# Patient Record
Sex: MALE | Race: WHITE | Employment: FULL TIME | URBAN - METROPOLITAN AREA
[De-identification: names, ages, dates, MRNs, and addresses within clinical notes are randomized per-mention and may not be internally consistent; named-entity substitution may affect disease eponyms.]

---

## 2018-07-01 ENCOUNTER — HOSPITAL ENCOUNTER (OUTPATIENT)
Dept: OTHER | Age: 23
Discharge: HOME | End: 2018-07-01
Attending: PSYCHIATRY & NEUROLOGY | Admitting: PSYCHIATRY & NEUROLOGY

## 2018-07-12 LAB
CHOLESTEROL: 118 MG/DL
GLUCOSE FASTING: 76 MG/DL (ref 70–99)
HDLC SERPL-MCNC: 31 MG/DL
LDL CHOLESTEROL DIRECT: 91 MG/DL
TRIGL SERPL-MCNC: 68 MG/DL

## 2018-09-25 ENCOUNTER — HOSPITAL ENCOUNTER (EMERGENCY)
Age: 23
Discharge: HOME | End: 2018-09-25
Payer: COMMERCIAL

## 2018-09-25 ENCOUNTER — APPOINTMENT (OUTPATIENT)
Dept: CT IMAGING | Age: 23
End: 2018-09-25
Payer: COMMERCIAL

## 2018-09-25 VITALS
RESPIRATION RATE: 17 BRPM | HEIGHT: 71 IN | BODY MASS INDEX: 21 KG/M2 | SYSTOLIC BLOOD PRESSURE: 131 MMHG | TEMPERATURE: 97.9 F | HEART RATE: 81 BPM | WEIGHT: 150 LBS | OXYGEN SATURATION: 100 % | DIASTOLIC BLOOD PRESSURE: 88 MMHG
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
## 2018-09-25 DIAGNOSIS — S00.83XA FACIAL CONTUSION, INITIAL ENCOUNTER: ICD-10-CM

## 2018-09-25 DIAGNOSIS — S09.90XA CLOSED HEAD INJURY, INITIAL ENCOUNTER: ICD-10-CM

## 2018-09-25 PROCEDURE — 70450 CT HEAD/BRAIN W/O DYE: CPT

## 2018-09-25 PROCEDURE — 6370000000 HC RX 637 (ALT 250 FOR IP): Performed by: PHYSICIAN ASSISTANT

## 2018-09-25 PROCEDURE — 6360000002 HC RX W HCPCS: Performed by: PHYSICIAN ASSISTANT

## 2018-09-25 PROCEDURE — 70486 CT MAXILLOFACIAL W/O DYE: CPT

## 2018-09-25 PROCEDURE — 99284 EMERGENCY DEPT VISIT MOD MDM: CPT

## 2018-09-25 RX ORDER — ONDANSETRON 4 MG/1
4 TABLET, ORALLY DISINTEGRATING ORAL ONCE
Status: COMPLETED | OUTPATIENT
Start: 2018-09-25 | End: 2018-09-25

## 2018-09-25 RX ORDER — IBUPROFEN 400 MG/1
400 TABLET ORAL ONCE
Status: COMPLETED | OUTPATIENT
Start: 2018-09-25 | End: 2018-09-25

## 2018-09-25 RX ADMIN — ONDANSETRON 4 MG: 4 TABLET, ORALLY DISINTEGRATING ORAL at 15:28

## 2018-09-25 RX ADMIN — IBUPROFEN 400 MG: 400 TABLET ORAL at 15:28

## 2018-09-25 ASSESSMENT — PAIN DESCRIPTION - PAIN TYPE: TYPE: ACUTE PAIN

## 2018-09-25 ASSESSMENT — PAIN DESCRIPTION - LOCATION: LOCATION: FACE;HEAD

## 2018-09-25 ASSESSMENT — PAIN DESCRIPTION - FREQUENCY: FREQUENCY: CONTINUOUS

## 2018-09-25 ASSESSMENT — PAIN SCALES - GENERAL: PAINLEVEL_OUTOF10: 7

## 2018-09-25 ASSESSMENT — PAIN DESCRIPTION - DESCRIPTORS: DESCRIPTORS: THROBBING
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
## 2019-03-05 ENCOUNTER — HOSPITAL ENCOUNTER (EMERGENCY)
Age: 24
Discharge: HOME | End: 2019-03-05
Payer: COMMERCIAL

## 2019-03-05 ENCOUNTER — APPOINTMENT (OUTPATIENT)
Dept: CT IMAGING | Age: 24
End: 2019-03-05
Payer: COMMERCIAL

## 2019-03-05 VITALS
RESPIRATION RATE: 22 BRPM | DIASTOLIC BLOOD PRESSURE: 86 MMHG | OXYGEN SATURATION: 98 % | SYSTOLIC BLOOD PRESSURE: 125 MMHG | TEMPERATURE: 97.9 F | HEART RATE: 88 BPM
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
## 2019-03-05 DIAGNOSIS — M54.2 NECK PAIN: ICD-10-CM

## 2019-03-05 DIAGNOSIS — M54.50 ACUTE MIDLINE LOW BACK PAIN WITHOUT SCIATICA: ICD-10-CM

## 2019-03-05 DIAGNOSIS — S09.90XA INJURY OF HEAD, INITIAL ENCOUNTER: ICD-10-CM

## 2019-03-05 PROCEDURE — 70450 CT HEAD/BRAIN W/O DYE: CPT

## 2019-03-05 PROCEDURE — 72128 CT CHEST SPINE W/O DYE: CPT

## 2019-03-05 PROCEDURE — 72192 CT PELVIS W/O DYE: CPT

## 2019-03-05 PROCEDURE — 99284 EMERGENCY DEPT VISIT MOD MDM: CPT

## 2019-03-05 PROCEDURE — 72131 CT LUMBAR SPINE W/O DYE: CPT

## 2019-03-05 PROCEDURE — 72125 CT NECK SPINE W/O DYE: CPT

## 2019-03-05 RX ORDER — CYCLOBENZAPRINE HCL 10 MG
10 TABLET ORAL 3 TIMES DAILY PRN
Qty: 10 TABLET | Refills: 0 | Status: SHIPPED | OUTPATIENT
Start: 2019-03-05 | End: 2019-03-12

## 2019-03-05 RX ORDER — NAPROXEN 500 MG/1
500 TABLET ORAL 2 TIMES DAILY
Qty: 15 TABLET | Refills: 0 | Status: SHIPPED | OUTPATIENT
Start: 2019-03-05 | End: 2020-08-02

## 2019-03-05 ASSESSMENT — PAIN SCALES - GENERAL: PAINLEVEL_OUTOF10: 5

## 2019-05-03 ENCOUNTER — HOSPITAL ENCOUNTER (EMERGENCY)
Age: 24
Discharge: HOME | End: 2019-05-03
Payer: COMMERCIAL

## 2019-05-03 VITALS
DIASTOLIC BLOOD PRESSURE: 68 MMHG | RESPIRATION RATE: 18 BRPM | OXYGEN SATURATION: 98 % | TEMPERATURE: 97.9 F | HEART RATE: 79 BPM | WEIGHT: 150 LBS | SYSTOLIC BLOOD PRESSURE: 129 MMHG | HEIGHT: 71 IN | BODY MASS INDEX: 21 KG/M2

## 2019-05-03 DIAGNOSIS — L03.116 CELLULITIS OF LEFT LOWER EXTREMITY: Primary | ICD-10-CM

## 2019-05-03 PROCEDURE — 6370000000 HC RX 637 (ALT 250 FOR IP): Performed by: PHYSICIAN ASSISTANT

## 2019-05-03 PROCEDURE — 99282 EMERGENCY DEPT VISIT SF MDM: CPT

## 2019-05-03 RX ORDER — SULFAMETHOXAZOLE AND TRIMETHOPRIM 800; 160 MG/1; MG/1
1 TABLET ORAL ONCE
Status: COMPLETED | OUTPATIENT
Start: 2019-05-03 | End: 2019-05-03

## 2019-05-03 RX ORDER — IBUPROFEN 600 MG/1
600 TABLET ORAL EVERY 6 HOURS PRN
Qty: 20 TABLET | Refills: 0 | Status: SHIPPED | OUTPATIENT
Start: 2019-05-03 | End: 2020-08-02

## 2019-05-03 RX ORDER — CEPHALEXIN 500 MG/1
500 CAPSULE ORAL 4 TIMES DAILY
Qty: 28 CAPSULE | Refills: 0 | Status: SHIPPED | OUTPATIENT
Start: 2019-05-03 | End: 2019-05-10

## 2019-05-03 RX ORDER — SULFAMETHOXAZOLE AND TRIMETHOPRIM 800; 160 MG/1; MG/1
1 TABLET ORAL 2 TIMES DAILY
Qty: 14 TABLET | Refills: 0 | Status: SHIPPED | OUTPATIENT
Start: 2019-05-03 | End: 2019-05-10

## 2019-05-03 RX ORDER — CEPHALEXIN 250 MG/1
500 CAPSULE ORAL ONCE
Status: COMPLETED | OUTPATIENT
Start: 2019-05-03 | End: 2019-05-03

## 2019-05-03 RX ORDER — IBUPROFEN 600 MG/1
600 TABLET ORAL ONCE
Status: COMPLETED | OUTPATIENT
Start: 2019-05-03 | End: 2019-05-03

## 2019-05-03 RX ADMIN — CEPHALEXIN 500 MG: 250 CAPSULE ORAL at 03:49

## 2019-05-03 RX ADMIN — SULFAMETHOXAZOLE AND TRIMETHOPRIM 1 TABLET: 800; 160 TABLET ORAL at 03:49

## 2019-05-03 RX ADMIN — IBUPROFEN 600 MG: 600 TABLET ORAL at 03:49

## 2019-05-03 ASSESSMENT — PAIN SCALES - GENERAL
PAINLEVEL_OUTOF10: 10
PAINLEVEL_OUTOF10: 10

## 2019-05-03 ASSESSMENT — PAIN DESCRIPTION - ORIENTATION: ORIENTATION: LEFT;UPPER

## 2019-05-03 ASSESSMENT — PAIN DESCRIPTION - LOCATION: LOCATION: LEG

## 2019-05-03 NOTE — ED PROVIDER NOTES
Triage Chief Complaint:   Wound Check    [place]:  Francois Augustin is a 26 y.o. male that presents a complaining of a red area, warmth, pain. Rash on the left inner thigh. Been present ×3 days \"a scab then redness ensued. No fever chills nausea vomiting diarrhea eating and drinking baseline eliminating baseline. He has has staph infection skin of in the past.    ROS:  REVIEW OF SYSTEMS    At least 10 systems reviewed      All other review of systems are negative  See HPI and nursing notes for additional information       Past Medical History:   Diagnosis Date    ADD (attention deficit disorder)     ADHD (attention deficit hyperactivity disorder)     Asthma     Bipolar 1 disorder ([facility] [address]     Murmur     Staph aureus infection      Past Surgical History:   Procedure Laterality Date    HAND SURGERY Right      History reviewed. No pertinent family history.   Social History     Socioeconomic History    Marital status: Single     Spouse name: Not on file    Number of children: Not on file    Years of education: Not on file    Highest education level: Not on file   Occupational History    Not on file   Social Needs    Financial resource strain: Not on file    Food insecurity:     Worry: Not on file     Inability: Not on file    Transportation needs:     Medical: Not on file     Non-medical: Not on file   Tobacco Use    Smoking status: Never Smoker    Smokeless tobacco: Never Used   Substance and Sexual Activity    Alcohol use: No    Drug use: Yes     Types: Marijuana    Sexual activity: Not on file   Lifestyle    Physical activity:     Days per week: Not on file     Minutes per session: Not on file    Stress: Not on file   Relationships    Social connections:     Talks on phone: Not on file     Gets together: Not on file     Attends Orthodox service: Not on file     Active member of club or organization: Not on file     Attends meetings of clubs or organizations: Not on file     Relationship status: Not on file    Intimate partner violence:     Fear of current or ex partner: Not on file     Emotionally abused: Not on file     Physically abused: Not on file     Forced sexual activity: Not on file   Other Topics Concern    Not on file   Social History Narrative    Not on file     Current Facility-Administered Medications   Medication Dose Route Frequency Provider Last Rate Last Dose    ibuprofen (ADVIL;MOTRIN) tablet 600 mg  600 mg Oral Once Neha Mayur, PA-C        sulfamethoxazole-trimethoprim (BACTRIM DS;SEPTRA DS) 800-160 MG per tablet 1 tablet  1 tablet Oral Once Neha Mayur, PA-C        cephALEXin (KEFLEX) capsule 500 mg  500 mg Oral Once Neha Pock, PA-C         Current Outpatient Medications   Medication Sig Dispense Refill    sulfamethoxazole-trimethoprim (BACTRIM DS) 800-160 MG per tablet Take 1 tablet by mouth 2 times daily for 7 days 14 tablet 0    cephALEXin (KEFLEX) 500 MG capsule Take 1 capsule by mouth 4 times daily for 7 days 28 capsule 0    ibuprofen (IBU) 600 MG tablet Take 1 tablet by mouth every 6 hours as needed for Pain 20 tablet 0    naproxen (NAPROSYN) 500 MG tablet Take 1 tablet by mouth 2 times daily 15 tablet 0    ondansetron (ZOFRAN ODT) 4 MG disintegrating tablet Take 1 tablet by mouth every 8 hours as needed for Nausea 15 tablet 0    acetaminophen (AMINOFEN) 325 MG tablet Take 2 tablets by mouth every 6 hours as needed for Pain 30 tablet 0    methylPREDNISolone (MEDROL, DIONNA,) 4 MG tablet Medrol Dose dionna - Use as directed. #1 pack. (No refills) 1 kit 0     No Known Allergies    Nursing Notes Reviewed    Physical Exam:  ED Triage Vitals [05/03/19 0308]   Enc Vitals Group      /68      Pulse 79      Resp 18      Temp 97.9 °F (36.6 °C)      Temp Source Oral      SpO2 100 %      Weight 150 lb (68 kg)      Height 5' 11\" (1.803 m)      Head Circumference       Peak Flow       Pain Score       Pain Loc       Pain Edu? Excl.  in [address]? General :Patient is awake alert oriented person place and time no acute distress nontoxic appearing  HEENT: Pupils are equally round and reactive to light extraocular motors are intact conjunctivae clear sclerae white there is no injection no icterus. Nose without any rhinorrhea or epistaxis. Chest wall: There is no tenderness to palpation over the chest wall or over ribs  Abdomen: Abdomen is soft nontender nondistended. There is no firm or pulsatile masses no rebound rigidity or guarding negative Mckeon's negative McBurney, no peritoneal signs  Suprapubic:  there is no tenderness to palpation over the external bladder   Musculoskeletal: 5 out of 5 strength in all 4 extremities full flexion extension abduction and adduction supination pronation of all extremities and all digits. No obvious muscle atrophy is noted. No focal muscle deficits are appreciated  Dermatology: Skin is warm and dry there is no obvious abscesses lacerations or lesions noted. Left inner thigh has a small scab with surrounding erythema, warmth. Erythema is branching approximately 6 x 10 cm. With no streaking. Psych: Mentation is grossly normal cognition is grossly normal. Affect is appropriate  Neuro: Motor intact sensory intact cranial nerves II through XII are intact level of consciousness is normal cerebellar function is normal reflexes are grossly normal. No evidence of incontinence or loss of bowel or bladder no saddle anesthesia noted       I have reviewed and interpreted all of the currently available lab results from this visit (if applicable):  No results found for this visit on 05/03/19. Radiographs (if obtained):  [] The following radiograph was interpreted by myself in the absence of a radiologist:   [] Radiologist's Report Reviewed:  No orders to display       EKG (if obtained):   Please See Note of attending physician for EKG interpretation. Chart review shows recent radiograph(s):  No results found.     MDM:   Systemic symptoms patient presents today with cellulitis to the left inner thigh. No involvement of the testicles. There is not circumferential. Vital signs are stable. Patient's afebrile. Versus her antibodies and she is provided as well as prescription for the same. Return precautions are provided for sooner return. Patient is to come here in 48 hours for wound check. My typical dicussion, presentation, and considerations for this patients' chief complaint, diagnosis, differential diagnosis, medications, medication use, medication safety and medication interactions have been explained and outlined to this patient for this patient encounter. I have stressed need for follow up and reexamination for this encounter and or return to the emergency department if any changes or any concern  I have discussed the findings of today's workup with the patient and present family members and have addressed their questions and concerns. Important warning signs as well as new or worsening symptoms which would necessitate immediate return to the ED were discussed. The plan is to discharge from the ED at this time, and the patient is in stable condition. The patient acknowledged understanding is agreeable with this plan. The patient and/or family and I have discussed the diagnosis and risks, and we agree with discharging home to follow-up with their primary care, specialist or referral doctor. Questions addressed. Disposition and follow-up agreed upon. Specific discharge instructions explained. We have discussed the symptoms which are most concerning that necessitate immediate return. We also discussed returning to the Emergency Department immediately if new or worsening symptoms occur.        I independently managed patient today in the ED        /68   Pulse 79   Temp 97.9 °F (36.6 °C) (Oral)   Resp 18   Ht 5' 11\" (1.803 m)   Wt 150 lb (68 kg)   SpO2 100%   BMI 20.92 kg/m²       Clinical Impression:  1.  Cellulitis of left lower extremity        Disposition referral (if applicable):  [facility] Emergency Department  [address]  [phone]  In 2 days  For wound re-check    Disposition medications (if applicable):  New Prescriptions    CEPHALEXIN (KEFLEX) 500 MG CAPSULE    Take 1 capsule by mouth 4 times daily for 7 days    IBUPROFEN (IBU) 600 MG TABLET    Take 1 tablet by mouth every 6 hours as needed for Pain    SULFAMETHOXAZOLE-TRIMETHOPRIM (BACTRIM DS) 800-160 MG PER TABLET    Take 1 tablet by mouth 2 times daily for 7 days         Comment: Please note this report has been produced using speech recognition software and may contain errors related to that system including errors in grammar, punctuation, and spelling, as well as words and phrases that may be inappropriate. If there are any questions or concerns please feel free to contact the dictating provider for clarification.       Tanesha London, [address] [place] [address]  05/03/19 9728

## 2019-06-01 ENCOUNTER — HOSPITAL ENCOUNTER (EMERGENCY)
Age: 24
Discharge: HOME | End: 2019-06-02
Payer: COMMERCIAL

## 2019-06-01 DIAGNOSIS — L02.91 ABSCESS: Primary | ICD-10-CM

## 2019-06-01 PROCEDURE — 99283 EMERGENCY DEPT VISIT LOW MDM: CPT

## 2019-06-01 PROCEDURE — 4500000028 HC INTERMEDIATE PROCEDURE

## 2019-06-01 ASSESSMENT — PAIN DESCRIPTION - LOCATION: LOCATION: BUTTOCKS

## 2019-06-01 ASSESSMENT — PAIN DESCRIPTION - PAIN TYPE: TYPE: ACUTE PAIN

## 2019-06-01 ASSESSMENT — PAIN SCALES - GENERAL: PAINLEVEL_OUTOF10: 10

## 2019-06-02 VITALS
OXYGEN SATURATION: 99 % | BODY MASS INDEX: 21 KG/M2 | HEIGHT: 71 IN | DIASTOLIC BLOOD PRESSURE: 91 MMHG | RESPIRATION RATE: 15 BRPM | HEART RATE: 91 BPM | SYSTOLIC BLOOD PRESSURE: 139 MMHG | WEIGHT: 150 LBS | TEMPERATURE: 98.3 F

## 2019-06-02 PROCEDURE — 2500000003 HC RX 250 WO HCPCS: Performed by: PHYSICIAN ASSISTANT

## 2019-06-02 RX ORDER — LIDOCAINE HYDROCHLORIDE AND EPINEPHRINE BITARTRATE 20; .01 MG/ML; MG/ML
20 INJECTION, SOLUTION SUBCUTANEOUS ONCE
Status: COMPLETED | OUTPATIENT
Start: 2019-06-02 | End: 2019-06-02

## 2019-06-02 RX ORDER — CEPHALEXIN 500 MG/1
500 CAPSULE ORAL 4 TIMES DAILY
Qty: 40 CAPSULE | Refills: 0 | Status: SHIPPED | OUTPATIENT
Start: 2019-06-02 | End: 2019-06-12

## 2019-06-02 RX ORDER — SULFAMETHOXAZOLE AND TRIMETHOPRIM 800; 160 MG/1; MG/1
1 TABLET ORAL 2 TIMES DAILY
Qty: 20 TABLET | Refills: 0 | Status: SHIPPED | OUTPATIENT
Start: 2019-06-02 | End: 2019-06-12

## 2019-06-02 RX ADMIN — LIDOCAINE HYDROCHLORIDE,EPINEPHRINE BITARTRATE 20 ML: 20; .01 INJECTION, SOLUTION INFILTRATION; PERINEURAL at 00:20

## 2019-06-02 ASSESSMENT — PAIN SCALES - GENERAL: PAINLEVEL_OUTOF10: 10

## 2019-06-02 NOTE — ED TRIAGE NOTES
Pt presents to ED for c/o abscess to left buttocks. Pt states was seen at hospital in [facility] a week ago. Pt states he is currently taking two ATB and has hx of staph infections. States being treated for most recent staph infection to left inner thigh and now appears to have infection left buttocks. States abscess is worsening and not improving with ATB use. Pt reports fevers and chills at home. Pt took ibuprofen at 10 AM today but not has had any since. Pt mason further complaints. A&ox4 speaking clear complete sentences. Call light in reach. Will cont. To monitor.

## 2019-06-02 NOTE — ED PROVIDER NOTES
eMERGENCY dEPARTMENT eNCOUnter        [facility]    Chief Complaint   Patient presents with    Abscess     buttocks       [facility] [place]    Belem Fajardo is a 26 y.o. male who presents with an abscess. Onset:  4 days ago. Location:  Left inner buttocks. Character:  Swollen, indurated, red, tender. Severity of pain is 10/10. Drainage:  Some bloody drainage. Fever:  Denies. Patient reports recurrent staph infections over the last month. He states he has been taking ABX intermittently, reports taking a dose of 2 different ABX this morning. REVIEW OF SYSTEMS    See HPI for further details. Review of systems otherwise negative. Constitutional:  Denies fever. Respiratory:  Denies any shortness of breath. Cardiovascular:  Denies chest pain. GI:  Denies nausea or vomiting. Musculoskeletal:  Denies extremity pain.   Integument:  + abscess    PAST MEDICAL HISTORY    Past Medical History:   Diagnosis Date    ADD (attention deficit disorder)     ADHD (attention deficit hyperactivity disorder)     Asthma     Bipolar 1 disorder (H[facility])     Murmur     Staph aureus infection        SURGICAL HISTORY    Past Surgical History:   Procedure Laterality Date    HAND SURGERY Right        CURRENT MEDICATIONS    Current Outpatient Rx   Medication Sig Dispense Refill    sulfamethoxazole-trimethoprim (BACTRIM DS) 800-160 MG per tablet Take 1 tablet by mouth 2 times daily for 10 days 20 tablet 0    cephALEXin (KEFLEX) 500 MG capsule Take 1 capsule by mouth 4 times daily for 10 days 40 capsule 0    ibuprofen (IBU) 600 MG tablet Take 1 tablet by mouth every 6 hours as needed for Pain 20 tablet 0    naproxen (NAPROSYN) 500 MG tablet Take 1 tablet by mouth 2 times daily 15 tablet 0    ondansetron (ZOFRAN ODT) 4 MG disintegrating tablet Take 1 tablet by mouth every 8 hours as needed for Nausea 15 tablet 0    acetaminophen (AMINOFEN) 325 MG tablet Take 2 tablets by mouth every 6 hours as needed for Pain 30 tablet 0    methylPREDNISolone (MEDROL, DIONNA,) 4 MG tablet Medrol Dose dionna - Use as directed. #1 pack. (No refills) 1 kit 0       ALLERGIES    No Known Allergies    FAMILY HISTORY    History reviewed. No pertinent family history. SOCIAL HISTORY    Social History     Socioeconomic History    Marital status: Single     Spouse name: None    Number of children: None    Years of education: None    Highest education level: None   Occupational History    None   Social Needs    Financial resource strain: None    Food insecurity:     Worry: None     Inability: None    Transportation needs:     Medical: None     Non-medical: None   Tobacco Use    Smoking status: Never Smoker    Smokeless tobacco: Never Used   Substance and Sexual Activity    Alcohol use: No    Drug use: Yes     Types: Marijuana    Sexual activity: None   Lifestyle    Physical activity:     Days per week: None     Minutes per session: None    Stress: None   Relationships    Social connections:     Talks on phone: None     Gets together: None     Attends Church service: None     Active member of club or organization: None     Attends meetings of clubs or organizations: None     Relationship status: None    Intimate partner violence:     Fear of current or ex partner: None     Emotionally abused: None     Physically abused: None     Forced sexual activity: None   Other Topics Concern    None   Social History Narrative    None       PHYSICAL EXAM    VITAL SIGNS: BP (!) 139/91   Pulse 91   Temp 98.3 °F (36.8 °C) (Oral)   Resp 15   Ht 5' 11\" (1.803 m)   Wt 150 lb (68 kg)   SpO2 99%   BMI 20.92 kg/m²   Constitutional:  Well developed, well nourished, no acute distress, non-toxic appearance   HENT:  NC/AT. Ears, nose, mouth normal.  Neck:  Supple. Respiratory:  Respirations unlabored. Musculoskeletal:  No deformities. Integument:  3 cm diameter area of induration/erythema to the left inner buttocks area, does not extend to the rectum. PROCEDURES    Incision and Drainage Procedure Note    Indication: Abscess to left buttocks    Procedure: The patient was positioned appropriately and the skin was prepped with betadine. Local anesthesia was obtained using approximately 4 cc of 2% lidocaine with epi. A 1 cm incision was made and copious purulent material was expressed. Loculations were broken up with a curved hemostat. The drainage cavity was then irrigated with high pressure NS and a dressing was applied to the site. The patient tolerated the procedure well. No immediate complications. ED COURSE & MEDICAL DECISION MAKING    Pertinent Labs & Imaging studies reviewed. (See chart for details)  -  Patient seen and evaluated in the emergency department. -  Triage and nursing notes reviewed and incorporated. -  Old chart records reviewed and incorporated. -  Supervising physician was Dr. Marion Ford. Patient was seen independently. -  Differential diagnosis includes: abscess, cellulitis, lipoma, folliculitis, and others  -  Procedures:  I&D as noted above  -  Disposition:  Home   Prescriptions: Bactrim & Keflex--discussed I suspect these are the ABX he was previously prescribed. He states he only has about 3 days worth left, so will give another round and encouraged him to take them as prescribed to completion. Instructed on care including warm compresses and dressing changes. Re-check with PCP in 2-3 days or return to the ED for worsening symptoms, including increased erythema/induration/fluctuance, fever, or worse. Patient agreeable with plan of care and disposition. FINAL IMPRESSION    1.  Abscess                Bg Oliva PA-C  06/02/19 9399

## 2019-09-27 ENCOUNTER — HOSPITAL ENCOUNTER (EMERGENCY)
Age: 24
Discharge: HOME | End: 2019-09-27
Payer: COMMERCIAL

## 2019-09-27 VITALS
SYSTOLIC BLOOD PRESSURE: 127 MMHG | OXYGEN SATURATION: 99 % | BODY MASS INDEX: 21.7 KG/M2 | WEIGHT: 155 LBS | HEART RATE: 72 BPM | TEMPERATURE: 97.6 F | RESPIRATION RATE: 15 BRPM | HEIGHT: 71 IN | DIASTOLIC BLOOD PRESSURE: 76 MMHG

## 2019-09-27 DIAGNOSIS — L02.419 CELLULITIS AND ABSCESS OF LEG: Primary | ICD-10-CM

## 2019-09-27 DIAGNOSIS — L03.119 CELLULITIS AND ABSCESS OF LEG: Primary | ICD-10-CM

## 2019-09-27 PROCEDURE — 4500000028 HC INTERMEDIATE PROCEDURE

## 2019-09-27 PROCEDURE — 99283 EMERGENCY DEPT VISIT LOW MDM: CPT

## 2019-09-27 RX ORDER — SULFAMETHOXAZOLE AND TRIMETHOPRIM 800; 160 MG/1; MG/1
1 TABLET ORAL 2 TIMES DAILY
Qty: 14 TABLET | Refills: 0 | Status: SHIPPED | OUTPATIENT
Start: 2019-09-27 | End: 2019-10-04

## 2019-09-27 RX ORDER — CEPHALEXIN 500 MG/1
500 CAPSULE ORAL 4 TIMES DAILY
Qty: 28 CAPSULE | Refills: 0 | Status: SHIPPED | OUTPATIENT
Start: 2019-09-27 | End: 2019-10-04

## 2019-09-27 ASSESSMENT — PAIN DESCRIPTION - FREQUENCY: FREQUENCY: CONTINUOUS

## 2019-09-27 ASSESSMENT — PAIN DESCRIPTION - PROGRESSION: CLINICAL_PROGRESSION: GRADUALLY WORSENING

## 2019-09-27 ASSESSMENT — PAIN DESCRIPTION - ORIENTATION: ORIENTATION: RIGHT

## 2019-09-27 ASSESSMENT — PAIN SCALES - GENERAL: PAINLEVEL_OUTOF10: 9

## 2019-09-27 ASSESSMENT — PAIN DESCRIPTION - ONSET: ONSET: PROGRESSIVE

## 2019-09-27 ASSESSMENT — PAIN DESCRIPTION - LOCATION: LOCATION: LEG

## 2019-09-27 ASSESSMENT — PAIN DESCRIPTION - DESCRIPTORS: DESCRIPTORS: DISCOMFORT

## 2020-05-24 ENCOUNTER — HOSPITAL ENCOUNTER (EMERGENCY)
Age: 25
Discharge: HOME | End: 2020-05-24

## 2020-05-24 VITALS
DIASTOLIC BLOOD PRESSURE: 62 MMHG | WEIGHT: 160 LBS | HEART RATE: 90 BPM | SYSTOLIC BLOOD PRESSURE: 137 MMHG | HEIGHT: 70 IN | OXYGEN SATURATION: 98 % | TEMPERATURE: 98 F | RESPIRATION RATE: 18 BRPM | BODY MASS INDEX: 22.9 KG/M2

## 2020-05-24 PROCEDURE — 99282 EMERGENCY DEPT VISIT SF MDM: CPT

## 2020-05-24 RX ORDER — PREDNISONE 20 MG/1
40 TABLET ORAL DAILY
Qty: 10 TABLET | Refills: 0 | Status: SHIPPED | OUTPATIENT
Start: 2020-05-24 | End: 2020-05-29

## 2020-05-24 RX ORDER — DIPHENHYDRAMINE HCL 25 MG
25 CAPSULE ORAL EVERY 6 HOURS PRN
Qty: 20 CAPSULE | Refills: 0 | Status: SHIPPED | OUTPATIENT
Start: 2020-05-24 | End: 2020-06-03

## 2020-05-24 ASSESSMENT — PAIN SCALES - GENERAL: PAINLEVEL_OUTOF10: 3

## 2020-05-24 ASSESSMENT — PAIN DESCRIPTION - PAIN TYPE: TYPE: ACUTE PAIN

## 2020-05-24 NOTE — ED PROVIDER NOTES
(AMINOFEN) 325 MG tablet Take 2 tablets by mouth every 6 hours as needed for Pain 30 tablet 0    methylPREDNISolone (MEDROL, DIONNA,) 4 MG tablet Medrol Dose dionna - Use as directed. #1 pack. (No refills) 1 kit 0       ALLERGIES    No Known Allergies    SOCIAL & FAMILY HISTORY    Social History     Socioeconomic History    Marital status: Single     Spouse name: None    Number of children: None    Years of education: None    Highest education level: None   Occupational History    None   Social Needs    Financial resource strain: None    Food insecurity     Worry: None     Inability: None    Transportation needs     Medical: None     Non-medical: None   Tobacco Use    Smoking status: Never Smoker    Smokeless tobacco: Never Used   Substance and Sexual Activity    Alcohol use: No    Drug use: Yes     Types: Marijuana    Sexual activity: None   Lifestyle    Physical activity     Days per week: None     Minutes per session: None    Stress: None   Relationships    Social connections     Talks on phone: None     Gets together: None     Attends Buddhism service: None     Active member of club or organization: None     Attends meetings of clubs or organizations: None     Relationship status: None    Intimate partner violence     Fear of current or ex partner: None     Emotionally abused: None     Physically abused: None     Forced sexual activity: None   Other Topics Concern    None   Social History Narrative    None     History reviewed. No pertinent family history. PHYSICAL EXAM    VITAL SIGNS: /62   Pulse 105   Temp 98 °F (36.7 °C) (Oral)   Resp 18   Ht 5' 10\" (1.778 m)   Wt 160 lb (72.6 kg)   SpO2 98%   BMI 22.96 kg/m²   Constitutional:  Well developed, well nourished  HENT:  Atraumatic, no facial or lip swelling  ORAL EXAM:   No tongue swelling, airway patent/Throat is clear  Neck/Lymphatics: supple, no JVD, no swollen nodes  Respiratory:  Nonlabored breathing.   Lungs clear.  Cardiovascular: Normal rate and rhythm on exam  Musculoskeletal:  No edema, no acute deformities. Integument:  Erythematous papules to bilateral antecubital fossa and torso. No induration or fluctuance. No oozing vesicles. no  petechiae, pustules, purpura        ED COURSE & MEDICAL DECISION MAKING      Patient presents as above. Patient is well-appearing, nontoxic and vitals are stable. Unclear etiology of rash at this time, possibly insect bite or outdoor exposure to plant. Patient will be started on prednisone burst and provided Benadryl. Recommend follow-up with primary care in 1 week as needed. Clinical  IMPRESSION    Rash and other nonspecific skin eruption      Diagnosis and plan discussed in detail with patient who understands and agrees. Return to emergency Department warning signs discussed in detail with patient today who understands and agrees including but not limited to worsening rash,fever, mouth/tongue/lip swelling, trouble breathing or swallowing, or any new symptoms not present today. Comment: Please note this report has been produced using speech recognition software and may contain errors related to that system including errors in grammar, punctuation, and spelling, as well as words and phrases that may be inappropriate. If there are any questions or concerns please feel free to contact the dictating provider for clarification.      Latasha Rinaldi PA-C  05/24/20 1947

## 2020-08-02 ENCOUNTER — APPOINTMENT (OUTPATIENT)
Dept: CT IMAGING | Age: 25
End: 2020-08-02

## 2020-08-02 ENCOUNTER — HOSPITAL ENCOUNTER (EMERGENCY)
Age: 25
Discharge: HOME | End: 2020-08-02

## 2020-08-02 VITALS
HEART RATE: 87 BPM | SYSTOLIC BLOOD PRESSURE: 140 MMHG | OXYGEN SATURATION: 96 % | TEMPERATURE: 98.7 F | DIASTOLIC BLOOD PRESSURE: 75 MMHG | WEIGHT: 160 LBS | BODY MASS INDEX: 22.9 KG/M2 | HEIGHT: 70 IN | RESPIRATION RATE: 16 BRPM

## 2020-08-02 PROCEDURE — 4500000027

## 2020-08-02 PROCEDURE — 90471 IMMUNIZATION ADMIN: CPT | Performed by: PHYSICIAN ASSISTANT

## 2020-08-02 PROCEDURE — 2500000003 HC RX 250 WO HCPCS: Performed by: PHYSICIAN ASSISTANT

## 2020-08-02 PROCEDURE — 72125 CT NECK SPINE W/O DYE: CPT

## 2020-08-02 PROCEDURE — 99284 EMERGENCY DEPT VISIT MOD MDM: CPT

## 2020-08-02 PROCEDURE — 6370000000 HC RX 637 (ALT 250 FOR IP): Performed by: PHYSICIAN ASSISTANT

## 2020-08-02 PROCEDURE — 70486 CT MAXILLOFACIAL W/O DYE: CPT

## 2020-08-02 PROCEDURE — 6360000002 HC RX W HCPCS: Performed by: PHYSICIAN ASSISTANT

## 2020-08-02 PROCEDURE — 70450 CT HEAD/BRAIN W/O DYE: CPT

## 2020-08-02 PROCEDURE — 90715 TDAP VACCINE 7 YRS/> IM: CPT | Performed by: PHYSICIAN ASSISTANT

## 2020-08-02 RX ORDER — LIDOCAINE HYDROCHLORIDE AND EPINEPHRINE BITARTRATE 20; .01 MG/ML; MG/ML
20 INJECTION, SOLUTION SUBCUTANEOUS ONCE
Status: COMPLETED | OUTPATIENT
Start: 2020-08-02 | End: 2020-08-02

## 2020-08-02 RX ORDER — ACETAMINOPHEN 500 MG
1000 TABLET ORAL ONCE
Status: COMPLETED | OUTPATIENT
Start: 2020-08-02 | End: 2020-08-02

## 2020-08-02 RX ADMIN — LIDOCAINE HYDROCHLORIDE,EPINEPHRINE BITARTRATE 20 ML: 20; .01 INJECTION, SOLUTION INFILTRATION; PERINEURAL at 05:31

## 2020-08-02 RX ADMIN — TETANUS TOXOID, REDUCED DIPHTHERIA TOXOID AND ACELLULAR PERTUSSIS VACCINE, ADSORBED 0.5 ML: 5; 2.5; 8; 8; 2.5 SUSPENSION INTRAMUSCULAR at 05:28

## 2020-08-02 RX ADMIN — ACETAMINOPHEN 1000 MG: 500 TABLET ORAL at 05:27

## 2020-08-02 ASSESSMENT — PAIN SCALES - GENERAL
PAINLEVEL_OUTOF10: 2
PAINLEVEL_OUTOF10: 2

## 2020-08-02 NOTE — ED NOTES
Bed: ED-18  Expected date:   Expected time:   Means of arrival:   Comments:  jennifer Borges RN  08/02/20 9555

## 2020-08-02 NOTE — ED NOTES
Ciara SALEH into suture patient, girlfriend in the room     [facility]  08/02/20 2528
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
## 2020-08-12 ENCOUNTER — HOSPITAL ENCOUNTER (EMERGENCY)
Age: 25
Discharge: HOME | End: 2020-08-12
Attending: EMERGENCY MEDICINE

## 2020-08-12 VITALS
SYSTOLIC BLOOD PRESSURE: 117 MMHG | HEIGHT: 68 IN | OXYGEN SATURATION: 97 % | RESPIRATION RATE: 18 BRPM | TEMPERATURE: 97.9 F | DIASTOLIC BLOOD PRESSURE: 69 MMHG | WEIGHT: 160 LBS | BODY MASS INDEX: 24.25 KG/M2 | HEART RATE: 98 BPM

## 2020-08-12 PROCEDURE — 99281 EMR DPT VST MAYX REQ PHY/QHP: CPT

## 2020-08-12 NOTE — ED PROVIDER NOTES
As physician-in-triage, I performed a medical screening history and physical exam on this patient. HISTORY OF PRESENT ILLNESS  Jack Rodrigez is a 27 y.o. male presents to the emergency department with stitches to his left cheek that have been in for a week. No drainage. No fevers or chills. States he is here to have them removed. Blanquita Boop PHYSICAL EXAM  /69   Pulse 98   Temp 97.9 °F (36.6 °C) (Oral)   Resp 18   Ht 5' 8\" (1.727 m)   Wt 160 lb (72.6 kg)   SpO2 97%   BMI 24.33 kg/m²     On exam, the patient appears in no acute distress. Speech is clear. Breathing is unlabored. Moves all extremities    Comment: Please note this report has been produced using speech recognition software and may contain errors related to that system including errors in grammar, punctuation, and spelling, as well as words and phrases that may be inappropriate. If there are any questions or concerns please feel free to contact the dictating provider for clarification.        Nita Hui MD  08/12/20 7474

## 2020-08-12 NOTE — ED PROVIDER NOTES
Emergency Department Encounter    Patient: Julian Garces  MRN: [id number]  : 1995  Date of Evaluation: 2020  ED Provider:  [address] [facility]      Triage Chief Complaint:   Suture / Staple Removal      [place]:  Julian Garces is a 27 y.o. male that presents to the emergency department for suture removal.  Patient was seen and evaluated in our emergency department on 2020 after sustaining a laceration to his left cheek. 9 simple interrupted stitches were applied in a single layer. Patient taught procedure well without complications. Instructed to follow-up for suture removal.  He does not have a primary care physician and decided to come here. He denies pus or purulent drainage. There is no wound dehiscence. Denies numbness, tingling, weakness. Denies any bleeding. Denies additional precipitating, modifying, limiting factors. ROS - see HPI, below listed is current ROS at time of my eval:  A complete 10 point review of systems was completed and is as dictated above, otherwise negative. Past Medical History:   Diagnosis Date    ADD (attention deficit disorder)     ADHD (attention deficit hyperactivity disorder)     Asthma     Bipolar 1 disorder (HCC)     Murmur     Staph aureus infection      Past Surgical History:   Procedure Laterality Date    HAND SURGERY Right      No family history on file. Social History     Socioeconomic History    Marital status: Single     Spouse name: Not on file    Number of children: Not on file    Years of education: Not on file    Highest education level: Not on file   Occupational History    Not on file   Social Needs    Financial resource strain: Not on file    Food insecurity     Worry: Not on file     Inability: Not on file    Transportation needs     Medical: Not on file     Non-medical: Not on file   Tobacco Use    Smoking status: Never Smoker    Smokeless tobacco: Never Used   Substance and Sexual Activity    Alcohol use:  Yes Comment: had 2 beers today, doesnt usually drink    Drug use: Yes     Types: Marijuana     Comment: daily    Sexual activity: Not on file   Lifestyle    Physical activity     Days per week: Not on file     Minutes per session: Not on file    Stress: Not on file   Relationships    Social connections     Talks on phone: Not on file     Gets together: Not on file     Attends Bahai service: Not on file     Active member of club or organization: Not on file     Attends meetings of clubs or organizations: Not on file     Relationship status: Not on file    Intimate partner violence     Fear of current or ex partner: Not on file     Emotionally abused: Not on file     Physically abused: Not on file     Forced sexual activity: Not on file   Other Topics Concern    Not on file   Social History Narrative    Not on file     No current facility-administered medications for this encounter. No current outpatient medications on file. No Known Allergies    Nursing Notes Reviewed    Physical Exam:  Triage VS:    ED Triage Vitals [08/12/20 6928]   Enc Vitals Group      /69      Pulse 98      Resp 18      Temp 97.9 °F (36.6 °C)      Temp Source Oral      SpO2 97 %      Weight 160 lb (72.6 kg)      Height 5' 8\" (1.727 m)      Head Circumference       Peak Flow       Pain Score       Pain Loc       Pain Edu? Excl. in [address]? My pulse ox interpretation is - normal    General appearance:  No acute distress. Skin:  Warm. Dry. Intact. Patient has a repaired, healed 4 cm left cheek laceration that has sutures that are intact. There is no pus or building drainage. No subcutaneous emphysema. No skin crepitus. Eye: Pupils are equal, round, reactive. No periorbital ecchymosis. No raccoon eyes. Head, ears, nose, mouth and throat: Is normocephalic and atraumatic. There are no external masses or lesions. No nasal drainage. Pharynx is clear. Airways patent. No carcamo sign. No hemotympanum.   No CSF otorrhea or rhinorrhea. No missing dentition or dental trauma. No tongue or lip lacerations. Neck: Supple. No nuchal rigidity. Trachea is midline. No mass or thyromegaly. No JVD. No carotid thrills or bruits. Extremity:  No swelling. Normal ROM all 4 extremities. Heart:  Regular rate and rhythm, normal S1 & S2, no extra heart sounds. Respiratory:  Lungs clear to auscultation bilaterally. Respirations nonlabored. Neurological:  No focal neuro deficits. MDM:  Patient was seen and evaluated in the emergency department by myself. A thorough history and physical exam were performed, prior medical records reviewed. Vital signs were reviewed and were stable. Patient presented status post suture repair on 8/2/2020. Wound appeared fully healed. We recommended suture repair. Patient was agreeable. 9 sutures were removed. Patient taught procedure without complications. He was counseled on the possibility of a scar. Was pleased with the cosmetic result. Instructed to follow-up with primary care physician. Instructed to return to the emergency room immediately with any new, worsening, concerning symptoms. Additional verbal and printed discharge instructions were provided. Patient verbalized understanding was agreeable. Patient was discharged in stable, nontoxic condition. Clinical Impression:  1. Visit for suture removal    2. Cheek laceration, left, sequela      Disposition referral (if applicable):  [facility] Emergency Department  [facility] [place] [id number]  [phone]  Go to   Immediately with any new, worsening, concerning symptoms. [facility]  [address] [place] [address]  [phone]  In 3 days  Please follow-up with primary care physician for reevaluation and to establish care.       ED Provider Disposition Time  DISPOSITION Decision To Discharge 08/12/2020 05:48:59 PM      Comment: Please note this report has been produced using speech recognition software and may contain errors related to that system including errors in grammar, punctuation, and spelling, as well as words and phrases that may be inappropriate. Efforts were made to edit the dictations.        [facility],   08/12/20 9104

## 2020-08-30 ENCOUNTER — HOSPITAL ENCOUNTER (EMERGENCY)
Age: 25
Discharge: HOME | End: 2020-08-30

## 2020-08-30 ENCOUNTER — APPOINTMENT (OUTPATIENT)
Dept: GENERAL RADIOLOGY | Age: 25
End: 2020-08-30

## 2020-08-30 VITALS
BODY MASS INDEX: 24.25 KG/M2 | SYSTOLIC BLOOD PRESSURE: 110 MMHG | RESPIRATION RATE: 18 BRPM | HEIGHT: 68 IN | HEART RATE: 68 BPM | DIASTOLIC BLOOD PRESSURE: 95 MMHG | WEIGHT: 160 LBS | TEMPERATURE: 97.8 F | OXYGEN SATURATION: 98 %

## 2020-08-30 PROCEDURE — 73610 X-RAY EXAM OF ANKLE: CPT

## 2020-08-30 PROCEDURE — 99284 EMERGENCY DEPT VISIT MOD MDM: CPT

## 2020-08-30 ASSESSMENT — PAIN DESCRIPTION - PAIN TYPE: TYPE: ACUTE PAIN

## 2020-08-30 ASSESSMENT — PAIN DESCRIPTION - DESCRIPTORS: DESCRIPTORS: SHARP

## 2020-08-30 ASSESSMENT — PAIN DESCRIPTION - LOCATION: LOCATION: ANKLE

## 2020-08-30 ASSESSMENT — PAIN SCALES - GENERAL: PAINLEVEL_OUTOF10: 10

## 2020-08-30 NOTE — ED NOTES
Discharge instructions reviewed with pt and questions addressed at this time. Pt alert and oriented x 4 at time of discharge, no signs of acute distress noted. Pt ambulatory to Emergency Department waiting room and steady gait noted.         Jr Dykes RN  08/30/20 7771

## 2020-08-30 NOTE — ED PROVIDER NOTES
EMERGENCY DEPARTMENT ENCOUNTER      PCP: No primary care provider on file. CHIEF COMPLAINT    Chief Complaint   Patient presents with    Ankle Injury         This patient was not evaluated by the attending physician. I have independently evaluated this patient . HPI    Gage Santillan is a 27 y.o. male who presents with left ankle pain. Onset prior to arrival.  Context is patient was walking down steps and stepped broke causing his foot to \"go through\". He notes pain over the posterior heel region since. Pain does not radiate. Pain is worse with direct palpation. No other injuries. No distal numbness, tingling, weakness, or functional deficit. No other pain. REVIEW OF SYSTEMS    General: Denies constitutional symptoms. Cardiac: Denies chest wall injury or chest pain  Pulmonary: Denies chest wall injury or shortness of breath  GI: Denies abdomen injury or abdominal pain  Musculoskeletal:  Denies any other musculoskeletal pain or injuries, including chest wall and back. Skin:  Skin intact. Lymphatics:  No nodules or streaks.       See HPI and nursing notes for additional information     PAST MEDICAL & SURGICAL HISTORY    Past Medical History:   Diagnosis Date    ADD (attention deficit disorder)     ADHD (attention deficit hyperactivity disorder)     Asthma     Bipolar 1 disorder (HCC)     Murmur     Staph aureus infection      Past Surgical History:   Procedure Laterality Date    HAND SURGERY Right        CURRENT MEDICATIONS        ALLERGIES    No Known Allergies    SOCIAL & FAMILY HISTORY    Social History     Socioeconomic History    Marital status: Single     Spouse name: None    Number of children: None    Years of education: None    Highest education level: None   Occupational History    None   Social Needs    Financial resource strain: None    Food insecurity     Worry: None     Inability: None    Transportation needs     Medical: None     Non-medical: None Tobacco Use    Smoking status: Never Smoker    Smokeless tobacco: Never Used   Substance and Sexual Activity    Alcohol use: Yes     Comment: had 2 beers today, doesnt usually drink    Drug use: Yes     Types: Marijuana     Comment: daily    Sexual activity: None   Lifestyle    Physical activity     Days per week: None     Minutes per session: None    Stress: None   Relationships    Social connections     Talks on phone: None     Gets together: None     Attends Baptist service: None     Active member of club or organization: None     Attends meetings of clubs or organizations: None     Relationship status: None    Intimate partner violence     Fear of current or ex partner: None     Emotionally abused: None     Physically abused: None     Forced sexual activity: None   Other Topics Concern    None   Social History Narrative    None     History reviewed. No pertinent family history. PHYSICAL EXAM    VITAL SIGNS: BP (!) 110/95   Pulse 68   Temp 97.8 °F (36.6 °C) (Oral)   Resp 18   Ht 5' 8\" (1.727 m)   Wt 160 lb (72.6 kg)   SpO2 98%   BMI 24.33 kg/m²   Constitutional:  Well developed, well nourished, no acute distress, non-toxic appearance   HENT:  Atraumatic    Musculoskeletal:   Left ankle/left foot exam: No gross deformity or discoloration. There is mild tenderness over the posterior calcaneal region without redness, warmth, palpable defect. Achilles tendon intact. No Achilles palpable defects. Remainder ankle and foot and lower leg exam without abnormality. No calf swelling or tenderness. No cord. Homans sign negative. Distal capillary refill, pulses, sensation and motor intact. Examination of remaining extremities reveals active ROM of  joints without ligamentous laxity. Theres no obvious joint or bony deformity. Lymphatics:  No swollen nodes or streaks. Integument:  Well hydrated, no rash.  skin intact  Vascular: affected extremity distally neurovascularly intact - pulses, sensation, and capillary refill intact. Neurologic:  Awake alert, normal flow of speech. Cranial nerves II through XII grossly intact. Psychiatric: Cooperative, pleasant affect    RADIOLOGY/PROCEDURES    XR ANKLE LEFT (MIN 3 VIEWS)   Final Result   Negative left ankle. ED COURSE & MEDICAL DECISION MAKING        Patient presents as above. History and exam today consistent with contusion of the affected area. No evidence of infection or vascular compromise. I offered patient splinting and crutches today-he elects to hold and states he will elevate and ice as needed and take NSAIDs as needed. I recommend rest over the next several days if no improvement within 5-7 days, patient to follow with orthopedist.    Patient agrees to return emergency department if symptoms worsen or any new symptoms develop. Clinical  IMPRESSION    1. Acute left ankle pain              Comment: Please note this report has been produced using speech recognition software and may contain errors related to that system including errors in grammar, punctuation, and spelling, as well as words and phrases that may be inappropriate. If there are any questions or concerns please feel free to contact the dictating provider for clarification.        Eliceo Alonsoma  [phone]

## 2020-09-14 ENCOUNTER — APPOINTMENT (OUTPATIENT)
Dept: CT IMAGING | Age: 25
End: 2020-09-14

## 2020-09-14 ENCOUNTER — HOSPITAL ENCOUNTER (EMERGENCY)
Age: 25
Discharge: HOME | End: 2020-09-14
Attending: EMERGENCY MEDICINE

## 2020-09-14 ENCOUNTER — APPOINTMENT (OUTPATIENT)
Dept: GENERAL RADIOLOGY | Age: 25
End: 2020-09-14

## 2020-09-14 VITALS
OXYGEN SATURATION: 98 % | DIASTOLIC BLOOD PRESSURE: 71 MMHG | TEMPERATURE: 98.2 F | WEIGHT: 165 LBS | HEART RATE: 63 BPM | RESPIRATION RATE: 13 BRPM | SYSTOLIC BLOOD PRESSURE: 117 MMHG | HEIGHT: 68 IN | BODY MASS INDEX: 25.01 KG/M2

## 2020-09-14 LAB
ALBUMIN SERPL-MCNC: 4.6 GM/DL (ref 3.4–5)
ALP BLD-CCNC: 110 IU/L (ref 40–129)
ALT SERPL-CCNC: 76 U/L (ref 10–40)
ANION GAP SERPL CALCULATED.3IONS-SCNC: 11 MMOL/L (ref 4–16)
AST SERPL-CCNC: 36 IU/L (ref 15–37)
BACTERIA: NEGATIVE /HPF
BASOPHILS ABSOLUTE: 0 K/CU MM
BASOPHILS RELATIVE PERCENT: 0.5 % (ref 0–1)
BILIRUB SERPL-MCNC: 0.7 MG/DL (ref 0–1)
BILIRUBIN URINE: NEGATIVE MG/DL
BLOOD, URINE: NEGATIVE
BUN BLDV-MCNC: 21 MG/DL (ref 6–23)
CALCIUM SERPL-MCNC: 9.9 MG/DL (ref 8.3–10.6)
CHLORIDE BLD-SCNC: 105 MMOL/L (ref 99–110)
CLARITY: CLEAR
CO2: 22 MMOL/L (ref 21–32)
COLOR: YELLOW
CREAT SERPL-MCNC: 1.1 MG/DL (ref 0.9–1.3)
DIFFERENTIAL TYPE: ABNORMAL
EOSINOPHILS ABSOLUTE: 0.2 K/CU MM
EOSINOPHILS RELATIVE PERCENT: 3.5 % (ref 0–3)
GFR AFRICAN AMERICAN: >60 ML/MIN/1.73M2
GFR NON-AFRICAN AMERICAN: >60 ML/MIN/1.73M2
GLUCOSE BLD-MCNC: 98 MG/DL (ref 70–99)
GLUCOSE, URINE: NEGATIVE MG/DL
HCT VFR BLD CALC: 43.3 % (ref 42–52)
HEMOGLOBIN: 15.2 GM/DL (ref 13.5–18)
IMMATURE NEUTROPHIL %: 0.4 % (ref 0–0.43)
KETONES, URINE: NEGATIVE MG/DL
LEUKOCYTE ESTERASE, URINE: NEGATIVE
LIPASE: 20 IU/L (ref 13–60)
LYMPHOCYTES ABSOLUTE: 1.3 K/CU MM
LYMPHOCYTES RELATIVE PERCENT: 21.9 % (ref 24–44)
MCH RBC QN AUTO: 31.1 PG (ref 27–31)
MCHC RBC AUTO-ENTMCNC: 35.1 % (ref 32–36)
MCV RBC AUTO: 88.7 FL (ref 78–100)
MONOCYTES ABSOLUTE: 0.3 K/CU MM
MONOCYTES RELATIVE PERCENT: 5.3 % (ref 0–4)
MUCUS: ABNORMAL HPF
NITRITE URINE, QUANTITATIVE: NEGATIVE
NUCLEATED RBC %: 0 %
PDW BLD-RTO: 11.8 % (ref 11.7–14.9)
PH, URINE: 7 (ref 5–8)
PLATELET # BLD: 154 K/CU MM (ref 140–440)
PMV BLD AUTO: 10.4 FL (ref 7.5–11.1)
POTASSIUM SERPL-SCNC: 4 MMOL/L (ref 3.5–5.1)
PROTEIN UA: NEGATIVE MG/DL
RBC # BLD: 4.88 M/CU MM (ref 4.6–6.2)
RBC URINE: ABNORMAL /HPF (ref 0–3)
SEGMENTED NEUTROPHILS ABSOLUTE COUNT: 3.9 K/CU MM
SEGMENTED NEUTROPHILS RELATIVE PERCENT: 68.4 % (ref 36–66)
SODIUM BLD-SCNC: 138 MMOL/L (ref 135–145)
SPECIFIC GRAVITY UA: 1.02 (ref 1–1.03)
SQUAMOUS EPITHELIAL: <1 /HPF
TOTAL IMMATURE NEUTOROPHIL: 0.02 K/CU MM
TOTAL NUCLEATED RBC: 0 K/CU MM
TOTAL PROTEIN: 7.2 GM/DL (ref 6.4–8.2)
TRICHOMONAS: ABNORMAL /HPF
TROPONIN T: <0.01 NG/ML
UROBILINOGEN, URINE: NORMAL MG/DL (ref 0.2–1)
WBC # BLD: 5.7 K/CU MM (ref 4–10.5)
WBC UA: ABNORMAL /HPF (ref 0–2)

## 2020-09-14 PROCEDURE — 71045 X-RAY EXAM CHEST 1 VIEW: CPT

## 2020-09-14 PROCEDURE — 84484 ASSAY OF TROPONIN QUANT: CPT

## 2020-09-14 PROCEDURE — 83690 ASSAY OF LIPASE: CPT

## 2020-09-14 PROCEDURE — 80053 COMPREHEN METABOLIC PANEL: CPT

## 2020-09-14 PROCEDURE — 81001 URINALYSIS AUTO W/SCOPE: CPT

## 2020-09-14 PROCEDURE — 99285 EMERGENCY DEPT VISIT HI MDM: CPT

## 2020-09-14 PROCEDURE — 70450 CT HEAD/BRAIN W/O DYE: CPT

## 2020-09-14 PROCEDURE — 72125 CT NECK SPINE W/O DYE: CPT

## 2020-09-14 PROCEDURE — 93005 ELECTROCARDIOGRAM TRACING: CPT | Performed by: PHYSICIAN ASSISTANT

## 2020-09-14 PROCEDURE — 85025 COMPLETE CBC W/AUTO DIFF WBC: CPT

## 2020-09-14 RX ORDER — HYDROXYZINE PAMOATE 25 MG/1
25 CAPSULE ORAL 3 TIMES DAILY PRN
Qty: 20 CAPSULE | Refills: 0 | Status: SHIPPED | OUTPATIENT
Start: 2020-09-14
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
## 2020-09-14 NOTE — ED NOTES
Pt is in room resting comfortably with no needs voiced at this time     Flor Javed, DEANN  09/14/20 5970

## 2020-09-14 NOTE — ED PROVIDER NOTES
eMERGENCY dEPARTMENT eNCOUnter      PCP: No primary care provider on file. CHIEF COMPLAINT    Chief Complaint   Patient presents with    Loss of Consciousness     syncope at work today - increased stress recently     Chest Pain     described as pressure - for about a month      Pt was seen by physician    MARK    Jessica Chavira is a 23 y.o. male who presents after syncopal episode at work as well as for chief complaint of some chest pain. Patient reports that for the last month he has had left-sided chest pain. Reports that he goes to sleep and wakes up with the chest pain every day. Never goes away. He does report that it seems to be worse in the morning. He smokes marijuana but denies other cardiac risk factors. Denies other risk factors for DVT PE. Today patient was at work. He does endorse that he was under a lot of stress lately. States that he was stressing about some things when he began to feel lightheaded dizzy and then had an unwitnessed syncopal event. Patient reports that he remembers being in 1 of the front [place] but then he woke up he was in a cooler. He states that he was told that he walked himself into the cooler but he does not remember this. He reports a mild posterior headache but denies any other pain or injuries related to the syncopal episode. Syncope was unwitnessed. Uncertain how long he was unconscious for.         REVIEW OF SYSTEMS    Constitutional:  Denies fever, chills, weight loss or weakness   HENT:  Denies sore throat or ear pain   Cardiovascular:  Denies palpitations   Respiratory:  Denies cough or shortness of breath    GI:  Denies abdominal pain, nausea, vomiting, or diarrhea  :  Denies any urinary symptoms   Musculoskeletal:  Denies back pain,   Skin:  Denies rash  Neurologic:  Denies focal weakness or sensory changes   Endocrine:  Denies polyuria or polydypsia   Lymphatic:  Denies swollen glands     All other review of systems are negative  See MARK and VITAL SIGNS: /74   Pulse 77   Temp 98.2 °F (36.8 °C) (Oral)   Resp 15   Ht 5' 8\" (1.727 m)   Wt 165 lb (74.8 kg)   SpO2 97%   BMI 25.09 kg/m²    Constitutional:  Well developed, Well nourished  HENT:  Normocephalic, Atraumatic, PERRL. EOMI. Sclera clear. Conjunctiva normal, No discharge. Neck/Lymphatics: supple, no JVD, no swollen nodes  Cardiovascular:  Normal heart rate, Normal rhythm, No murmurs  Respiratory:  Nonlabored breathing. Normal breath sounds, No wheezing  Abdomen: Bowel sounds normal, Soft, No tenderness, no masses. Musculoskeletal: No edema, No tenderness, No cyanosis  Integument:  Warm, Dry  Neurologic:  Alert & oriented , No focal deficits noted. Cranial nerves II through XII grossly intact. Finger to nose intact, rapid alternating movements intact. Normal gross motor coordination & motor strength bilateral upper and lower extremities,  upper and lower extremity DTRs intact. Sensation intact.   Psychiatric:  Affect normal, Mood normal.       Labs:  Results for orders placed or performed during the hospital encounter of 09/14/20   CBC w/ auto diff   Result Value Ref Range    WBC 5.7 4.0 - 10.5 K/CU MM    RBC 4.88 4.6 - 6.2 M/CU MM    Hemoglobin 15.2 13.5 - 18.0 GM/DL    Hematocrit 43.3 42 - 52 %    MCV 88.7 78 - 100 FL    MCH 31.1 (H) 27 - 31 PG    MCHC 35.1 32.0 - 36.0 %    RDW 11.8 11.7 - 14.9 %    Platelets 704 468 - 116 K/CU MM    MPV 10.4 7.5 - 11.1 FL    Differential Type AUTOMATED DIFFERENTIAL     Segs Relative 68.4 (H) 36 - 66 %    Lymphocytes % 21.9 (L) 24 - 44 %    Monocytes % 5.3 (H) 0 - 4 %    Eosinophils % 3.5 (H) 0 - 3 %    Basophils % 0.5 0 - 1 %    Segs Absolute 3.9 K/CU MM    Lymphocytes Absolute 1.3 K/CU MM    Monocytes Absolute 0.3 K/CU MM    Eosinophils Absolute 0.2 K/CU MM    Basophils Absolute 0.0 K/CU MM    Nucleated RBC % 0.0 %    Total Nucleated RBC 0.0 K/CU MM    Total Immature Neutrophil 0.02 K/CU MM    Immature Neutrophil % 0.4 0 - 0.43 %   CMP Result Value Ref Range    Sodium 138 135 - 145 MMOL/L    Potassium 4.0 3.5 - 5.1 MMOL/L    Chloride 105 99 - 110 mMol/L    CO2 22 21 - 32 MMOL/L    BUN 21 6 - 23 MG/DL    CREATININE 1.1 0.9 - 1.3 MG/DL    Glucose 98 70 - 99 MG/DL    Calcium 9.9 8.3 - 10.6 MG/DL    Alb 4.6 3.4 - 5.0 GM/DL    Total Protein 7.2 6.4 - 8.2 GM/DL    Total Bilirubin 0.7 0.0 - 1.0 MG/DL    ALT 76 (H) 10 - 40 U/L    AST 36 15 - 37 IU/L    Alkaline Phosphatase 110 40 - 129 IU/L    GFR Non-African American >60 >60 mL/min/1.73m2    GFR African American >60 >60 mL/min/1.73m2    Anion Gap 11 4 - 16   Lipase   Result Value Ref Range    Lipase 20 13 - 60 IU/L   Troponin   Result Value Ref Range    Troponin T <0.010 <0.01 NG/ML   Urinalysis   Result Value Ref Range    Color, UA YELLOW YELLOW    Clarity, UA CLEAR CLEAR    Glucose, Urine NEGATIVE NEGATIVE MG/DL    Bilirubin Urine NEGATIVE NEGATIVE MG/DL    Ketones, Urine NEGATIVE NEGATIVE MG/DL    Specific Gravity, UA 1.024 1.001 - 1.035    Blood, Urine NEGATIVE NEGATIVE    pH, Urine 7.0 5.0 - 8.0    Protein, UA NEGATIVE NEGATIVE MG/DL    Urobilinogen, Urine NORMAL 0.2 - 1.0 MG/DL    Nitrite Urine, Quantitative NEGATIVE NEGATIVE    Leukocyte Esterase, Urine NEGATIVE NEGATIVE    RBC, UA NONE SEEN 0 - 3 /HPF    WBC, UA NONE SEEN 0 - 2 /HPF    Bacteria, UA NEGATIVE NEGATIVE /HPF    Squam Epithel, UA <1 /HPF    Mucus, UA RARE (A) NEGATIVE HPF    Trichomonas, UA NONE SEEN NONE SEEN /HPF   EKG 12 Lead   Result Value Ref Range    Ventricular Rate 78 BPM    Atrial Rate 78 BPM    P-R Interval 158 ms    QRS Duration 104 ms    Q-T Interval 384 ms    QTc Calculation (Bazett) 437 ms    P Axis -1 degrees    R Axis 55 degrees    T Axis 28 degrees    Diagnosis       Normal sinus rhythm  Incomplete right bundle branch block  Borderline ECG  When compared with ECG of 17-SEP-2016 04:45,  Questionable change in QRS axis             EKG    See attending note    RADIOLOGY    Xr Ankle Left (min 3 Views)    Result Date: 8/30/2020  EXAMINATION: THREE XRAY VIEWS OF THE LEFT ANKLE 8/30/2020 3:04 pm COMPARISON: None. HISTORY: Reason for exam:->trauma, left ankle pain Acuity: Acute Type of Exam: Initial FINDINGS: No evidence of acute fracture or dislocation. Normal alignment of the ankle mortise. No focal osseous lesion. No evidence of joint effusion. No focal soft tissue abnormality. Negative left ankle. Ct Head Wo Contrast    Result Date: 9/14/2020  EXAMINATION: CT OF THE HEAD WITHOUT CONTRAST  9/14/2020 6:05 pm TECHNIQUE: CT of the head was performed without the administration of intravenous contrast. Dose modulation, iterative reconstruction, and/or weight based adjustment of the mA/kV was utilized to reduce the radiation dose to as low as reasonably achievable. COMPARISON: CT head August 2, 2020 HISTORY: ORDERING SYSTEM PROVIDED HISTORY: syncope TECHNOLOGIST PROVIDED HISTORY: Has a \"code stroke\" or \"stroke alert\" been called? ->No Reason for exam:->syncope Reason for Exam: SYNCOPE Acuity: Acute Type of Exam: Initial Additional signs and symptoms: LOC at work,pt states he's under a lot of stress FINDINGS: BRAIN/VENTRICLES: There is no acute intracranial hemorrhage, mass effect or midline shift. No abnormal extra-axial fluid collection. The gray-white differentiation is maintained without evidence of an acute infarct. There is no evidence of hydrocephalus. ORBITS: The visualized portion of the orbits demonstrate no acute abnormality. SINUSES: The visualized paranasal sinuses and mastoid air cells demonstrate no acute abnormality. SOFT TISSUES/SKULL:  No acute abnormality of the visualized skull or soft tissues. No acute intracranial abnormality.      Ct Cervical Spine Wo Contrast    Result Date: 9/14/2020  EXAMINATION: CT OF THE CERVICAL SPINE WITHOUT CONTRAST 9/14/2020 6:06 pm TECHNIQUE: CT of the cervical spine was performed without the administration of intravenous contrast. Multiplanar reformatted images are

## 2020-09-14 NOTE — ED PROVIDER NOTES
I independently examined and evaluated Lluvia Mobley. In brief, presents with chest pain x 1 month and episode of syncope at work today. Vitals:    09/14/20 1621   BP:    Pulse:    Resp:    Temp:    SpO2: 97%     Focused exam revealed well-appearing patient sitting up in the bed. Heart regular rate and rhythm, lungs clear to auscultation bilaterally. 2+ radial pulses bilaterally. No lower extremity swelling. Abdomen nontender. Normal gait. EKG:  Normal sinus rhythm with a rate of 78. GA interval 158, , QTc 437. No ST elevations or depressions. Normal T waves. Incomplete right bundle branch block. Impression: Nonspecific EKG. When compared to previous EKG from 9/17/2016, there are no significant changes. ED course:  Further discussion elicits that patient's chest pain and increased anxiety started after he stopped using marijuana about a month ago. EKG is not concerning for acute ischemia or arrhythmia. Basic labs were obtained and are unremarkable. Urine is not concerning for an infection. CT scan of his head shows no acute abnormalities. I suspect patient symptoms are more due to anxiety and panic and I do not suspect an emergent cause of his possible syncope today. We will discharge him home in stable condition. Recommended follow-up with mental health to discuss his anxiety and further treatment for it. I did don appropriate PPE (including N95 face mask, protective eye ware/safety glasses, gloves, hair covering, and no isolation gown), as recommended by the health facility/national standard best practice, during my bedside interactions with the patient. All diagnostic, treatment, and disposition decisions were made by myself in conjunction with the advanced practice provider. For all further details of the patient's emergency department visit, please see the advanced practice provider's documentation.     Comment: Please note this report has been produced using speech recognition software and may contain errors related to that system including errors in grammar, punctuation, and spelling, as well as words and phrases that may be inappropriate. If there are any questions or concerns please feel free to contact the dictating provider for clarification.         Roxy Zamudio MD  09/14/20 [id number]

## 2020-09-15 PROCEDURE — 93010 ELECTROCARDIOGRAM REPORT: CPT | Performed by: INTERNAL MEDICINE

## 2020-09-23 LAB
EKG ATRIAL RATE: 78 BPM
EKG DIAGNOSIS: NORMAL
EKG P AXIS: -1 DEGREES
EKG P-R INTERVAL: 158 MS
EKG Q-T INTERVAL: 384 MS
EKG QRS DURATION: 104 MS
EKG QTC CALCULATION (BAZETT): 437 MS
EKG R AXIS: 55 DEGREES
EKG T AXIS: 28 DEGREES
EKG VENTRICULAR RATE: 78 BPM

## 2021-07-12 ENCOUNTER — HOSPITAL ENCOUNTER (EMERGENCY)
Age: 26
Discharge: HOME | End: 2021-07-12
Payer: COMMERCIAL

## 2021-07-12 ENCOUNTER — APPOINTMENT (OUTPATIENT)
Dept: GENERAL RADIOLOGY | Age: 26
End: 2021-07-12
Payer: COMMERCIAL

## 2021-07-12 VITALS
WEIGHT: 165 LBS | OXYGEN SATURATION: 96 % | HEIGHT: 69 IN | HEART RATE: 82 BPM | DIASTOLIC BLOOD PRESSURE: 77 MMHG | TEMPERATURE: 98.6 F | RESPIRATION RATE: 18 BRPM | BODY MASS INDEX: 24.44 KG/M2 | SYSTOLIC BLOOD PRESSURE: 120 MMHG

## 2021-07-12 DIAGNOSIS — J06.9 UPPER RESPIRATORY INFECTION WITH COUGH AND CONGESTION: Primary | ICD-10-CM

## 2021-07-12 DIAGNOSIS — J02.9 ACUTE PHARYNGITIS, UNSPECIFIED ETIOLOGY: ICD-10-CM

## 2021-07-12 PROCEDURE — 87081 CULTURE SCREEN ONLY: CPT

## 2021-07-12 PROCEDURE — 6370000000 HC RX 637 (ALT 250 FOR IP): Performed by: PHYSICIAN ASSISTANT

## 2021-07-12 PROCEDURE — 71046 X-RAY EXAM CHEST 2 VIEWS: CPT

## 2021-07-12 PROCEDURE — 87077 CULTURE AEROBIC IDENTIFY: CPT

## 2021-07-12 PROCEDURE — 93005 ELECTROCARDIOGRAM TRACING: CPT | Performed by: PHYSICIAN ASSISTANT

## 2021-07-12 PROCEDURE — 87430 STREP A AG IA: CPT

## 2021-07-12 PROCEDURE — 99285 EMERGENCY DEPT VISIT HI MDM: CPT

## 2021-07-12 RX ORDER — BUTALBITAL, ACETAMINOPHEN AND CAFFEINE 50; 325; 40 MG/1; MG/1; MG/1
1 TABLET ORAL EVERY 4 HOURS PRN
Qty: 20 TABLET | Refills: 0 | Status: SHIPPED | OUTPATIENT
Start: 2021-07-12 | End: 2022-07-04

## 2021-07-12 RX ORDER — IBUPROFEN 600 MG/1
600 TABLET ORAL ONCE
Status: COMPLETED | OUTPATIENT
Start: 2021-07-12 | End: 2021-07-12

## 2021-07-12 RX ORDER — BUTALBITAL, ACETAMINOPHEN AND CAFFEINE 50; 325; 40 MG/1; MG/1; MG/1
1 TABLET ORAL ONCE
Status: COMPLETED | OUTPATIENT
Start: 2021-07-12 | End: 2021-07-12

## 2021-07-12 RX ORDER — LIDOCAINE HYDROCHLORIDE 20 MG/ML
15 SOLUTION OROPHARYNGEAL ONCE
Status: COMPLETED | OUTPATIENT
Start: 2021-07-12 | End: 2021-07-12

## 2021-07-12 RX ORDER — BENZOCAINE AND MENTHOL, UNSPECIFIED FORM 15; 2.3 MG/1; MG/1
1 LOZENGE ORAL
Qty: 30 LOZENGE | Refills: 0 | Status: SHIPPED | OUTPATIENT
Start: 2021-07-12

## 2021-07-12 RX ADMIN — IBUPROFEN 600 MG: 600 TABLET, FILM COATED ORAL at 11:15

## 2021-07-12 RX ADMIN — BUTALBITAL, ACETAMINOPHEN AND CAFFEINE 1 TABLET: 50; 325; 40 TABLET ORAL at 11:15

## 2021-07-12 RX ADMIN — LIDOCAINE HYDROCHLORIDE 15 ML: 20 SOLUTION ORAL; TOPICAL at 11:15

## 2021-07-12 ASSESSMENT — PAIN DESCRIPTION - LOCATION
LOCATION_2: THROAT
LOCATION: CHEST

## 2021-07-12 ASSESSMENT — PAIN DESCRIPTION - INTENSITY: RATING_2: 6

## 2021-07-12 ASSESSMENT — PAIN SCALES - GENERAL
PAINLEVEL_OUTOF10: 6
PAINLEVEL_OUTOF10: 6

## 2021-07-12 ASSESSMENT — PAIN DESCRIPTION - PAIN TYPE: TYPE: ACUTE PAIN

## 2021-07-12 NOTE — ED PROVIDER NOTES
ED attending EKG interpretation (I otherwise did not participate in the care of this patient)    EKG Interpretation  Interpreted by me  Compared to 9/4/20  Rhythm: normal sinus   Rate: normal 83  Axis: normal  Ectopy: none  Conduction: incomplete RBBB  ST Segments: no acute change  T Waves: no acute change  Clinical Impression: normal sinus rhythm, incomplete RBBB, no acute change     Loreto Deluna MD  07/12/21 2150

## 2021-07-12 NOTE — ED PROVIDER NOTES
eMERGENCY dEPARTMENT eNCOUnter         [address] [facility]    PCP: No primary care provider on file. CHIEF COMPLAINT    Chief Complaint   Patient presents with    Pharyngitis    Chest Pain         HPI    Nikki Fisher is a 23 y.o. male who presents with generalized fatigue, chest pain cough and sore throat. Also has prior travel, x4 days ago. Context is patient states symptoms began after contact with father had recently traveled out of state and returned with similar symptoms. He endorses gradual onset of generalized headache without thunderclap onset, denies worst headache of his life. Endorsing having nasal congestion postnasal drip and clear rhinorrhea. Reports burning sore throat pain and a productive cough with yellow sputum. States the cough does cause a \"burning\" his chest but no other pleuritic exertional chest pain or shortness of breath. No fever chills, rash or other abdominal/urinary complaints. Is endorsing 6/10 sore throat pain, worse with swallowing, sharp burning. Does have a history of frequent recurrent strep infections. No dizziness lightheadedness numbness tingling or weakness in the upper or lower extremities. Has tried over-the-counter cough and cold medications including Nyquil. Patient does admit to smoking marijuana to \"numb my throat pain. \"  Denies any tobacco use history. Does have a history of asthma, no underlying history for coronary disease. No calf pain or swelling. Denies any concern for contact with COVID-19 has received his first vaccination    REVIEW OF SYSTEMS    Constitutional:  No fever, chills  HEENT:  See above  Cardiovascular:  See HPI  Respiratory:  + cough.  No shortness of breath   GI:  Denies abdominal pain, vomiting, or diarrhea   Neurologic:  Denies confusion, light headedness, dizziness, or syncope       All other review of systems are negative  See HPI and nursing notes for additional information       PAST MEDICAL AND SURGICAL HISTORY    Past Medical History:   Diagnosis Date    ADD (attention deficit disorder)     ADHD (attention deficit hyperactivity disorder)     Asthma     Bipolar 1 disorder (HCC)     Murmur     Staph aureus infection      Past Surgical History:   Procedure Laterality Date    HAND SURGERY Right        CURRENT MEDICATIONS    Current Outpatient Rx   Medication Sig Dispense Refill    Pseudoephedrine-DM-GG 60- MG TABS Take 1 tablet by mouth every 6 hours 20 tablet 0    Benzocaine-Menthol (CEPACOL) 15-2.3 MG LOZG Take 1 lozenge by mouth every 3 hours as needed (pain) 30 lozenge 0    butalbital-acetaminophen-caffeine (FIORICET, ESGIC) -40 MG per tablet Take 1 tablet by mouth every 4 hours as needed for Headaches 20 tablet 0    hydrOXYzine (VISTARIL) 25 MG capsule Take 1 capsule by mouth 3 times daily as needed for Anxiety 20 capsule 0       ALLERGIES    No Known Allergies    FAMILY AND SOCIAL HISTORY    History reviewed. No pertinent family history. Social History     Socioeconomic History    Marital status: Single     Spouse name: None    Number of children: None    Years of education: None    Highest education level: None   Occupational History    None   Tobacco Use    Smoking status: Never Smoker    Smokeless tobacco: Never Used   Vaping Use    Vaping Use: Never used   Substance and Sexual Activity    Alcohol use: Yes     Comment: had 2 beers today, doesnt usually drink    Drug use: Yes     Types: Marijuana     Comment: daily    Sexual activity: None   Other Topics Concern    None   Social History Narrative    None     Social Determinants of Health     Financial Resource Strain:     Difficulty of Paying Living Expenses:    Food Insecurity:     Worried About Running Out of Food in the Last Year:     Ran Out of Food in the Last Year:    Transportation Needs:     Lack of Transportation (Medical):      Lack of Transportation (Non-Medical): Physical Activity:     Days of Exercise per Week:     Minutes of Exercise per Session:    Stress:     Feeling of Stress :    Social Connections:     Frequency of Communication with Friends and Family:     Frequency of Social Gatherings with Friends and Family:     Attends [employer] Services:     Active Member of Clubs or Organizations:     Attends Club or Organization Meetings:     Marital Status:    Intimate Partner Violence:     Fear of Current or Ex-Partner:     Emotionally Abused:     Physically Abused:     Sexually Abused:        PHYSICAL EXAM    VITAL SIGNS: /77   Pulse 82   Temp 98.6 °F (37 °C) (Oral)   Resp 18   Ht 5' 9\" (1.753 m)   Wt 165 lb (74.8 kg)   SpO2 96%   BMI 24.37 kg/m²   Constitutional:  Well developed, well nourished, no acute distress, non-toxic appearance     HEENT:        - Normocephalic, atraumatic       - Frontal/Maxillary sinuses NONtender to percussion. Eyes:    - PERRL, EOM intact, conjunctiva normal, sclera non-icteric       Ears:    -  No auricular redness or induration    -  External auditory canals clear    - TMs pearly grey and translucent without discharge, fluid, or bulging.    - Nasal passages with mildly erythematous turbinates. No massess. Oropharynx:    -  Oropharynx mildly erythematous without tonsillar hypertrophy or exudate.   - No midline shift of the uvula, no trismus, no facial swelling   - No inspiratory stridor. Tolerating secretions. No hot potato voice         Neck/Lymphatics:    - Supple neck without meningismus   -  No swollen lymph nodes. Respiratory:  95% on room air. Clear to auscultation bilateral lung fields, no wheezes/rales/rhonchi. No retractions, no accessory muscle use  Cardiovascular:  Normal rate, normal rhythm   GI:  Soft, no abdominal tenderness, no guarding. Musculoskeletal:  No obvious deficits, no edema   Integument:  Skin pink, warn, dry, intact.  No rash of scarletiniform appearance Neurologic: Awake alert and oriented, and no slurred speech, no tremors or ataxia, or athetotic movements noted        LABS:  Results for orders placed or performed during the hospital encounter of 07/12/21   Strep Screen Group A Throat    Specimen: Throat   Result Value Ref Range    Specimen THROAT     Special Requests NONE     Strep A Direct Screen NEGATIVE    EKG 12 Lead   Result Value Ref Range    Ventricular Rate 83 BPM    Atrial Rate 83 BPM    P-R Interval 150 ms    QRS Duration 102 ms    Q-T Interval 362 ms    QTc Calculation (Bazett) 425 ms    P Axis 23 degrees    R Axis 49 degrees    T Axis 38 degrees    Diagnosis       Normal sinus rhythm  Incomplete right bundle branch block  Borderline ECG  When compared with ECG of 14-SEP-2020 16:21,  No significant change was found           IMAGING:      XR CHEST (2 VW) (Final result)  Result time 07/12/21 11:14:06  Final result by Malathi Finch MD (07/12/21 11:14:06)                Impression:    Clear chest without acute cardiopulmonary process. Narrative:    EXAMINATION:   TWO XRAY VIEWS OF THE CHEST     7/12/2021 10:55 am     COMPARISON:   09/14/2020     HISTORY:   ORDERING SYSTEM PROVIDED HISTORY: cough   TECHNOLOGIST PROVIDED HISTORY:   Reason for exam:->cough   Reason for Exam: cough     FINDINGS:   Cardiac and mediastinal silhouettes appear within normal limits for size. Pulmonary vascularity is normal.  No parenchymal consolidation or pleural   effusion is identified.  No pneumothorax.  No acute osseous abnormality is   identified.                         EKG Interpretation  Please see ED physician's note - Dr. Agustin Garg - for EKG interpretation        ED COURSE & MEDICAL DECISION MAKING       Vital signs and nursing notes reviewed during ED course. I have independently evaluated this patient . Supervising MD  - Dr. Agustin Garg - present in the Emergency Department, available for consultation, throughout entirety of  patient care.   All pertinent Lab data and radiographic results reviewed with patient at bedside. The patient and/or the family were informed of the results of any tests/labs/imaging, the treatment plan, and time was allotted to answer questions. Differential Diagnoses:  Acute Bronchitis, Pneumonia, Airway Obstruction, Upper Respiratory Viral infection, Sinusitis, Pharyngitis, Amrita-Tonsillar Abscess,      Clinical  IMPRESSION    1. Upper respiratory infection with cough and congestion    2. Acute pharyngitis, unspecified etiology        Patient presents with generalized fatigue, headache sore throat and productive cough. On exam, well-appearing nontoxic 72-year-old male, afebrile no acute distress. Vitals are stable, 95% on room air. HEENT exam exam is unremarkable. Posterior oropharynx is patent, injected without uvula midline shift. Tolerating oral secretions. Lungs are clear to auscultation. No rash. Patient is given Fioricet, ibuprofen and viscous lidocaine. Rapid strep is negative. Chest xray is unremarkable. At this time, given the length of patient's symptoms recent sick contact, suspect likely viral etiology presenting symptoms. Discussed continued symptomatic management as antibiotics not clinically indicated at this time. Low clinical suspicion bacteremia/sepsis, pneumonia, meningitis/encephalitis, peritonsillar abscess/retropharyngeal abscess, epiglottitis, ACS, status asthmaticus. Patient is discharged stable condition with oral antitussives/nasal decongestant medication, Cepacol, Fioricet and ibuprofen. Encourage rest, pushing clear fluids, warm salt water gargles. Patient does not have PCP so I have given him/her doctor of the day contact information and encourage him/her to establish care and followup in the next 1-2 days. Return warnings back to the ED discussed. Diagnosis and plan discussed in detail with patient who understands and agrees.   Patient agrees to return emergency department if symptoms worsen or any new symptoms develop. Comment: Please note this report has been produced using speech recognition software and may contain errors related to that system including errors in grammar, punctuation, and spelling, as well as words and phrases that may be inappropriate. If there are any questions or concerns please feel free to contact the dictating provider for clarification.         Gloria Mota PA-C  07/12/21 4022

## 2021-07-13 ENCOUNTER — HOSPITAL ENCOUNTER (EMERGENCY)
Age: 26
Discharge: HOME | End: 2021-07-13
Payer: COMMERCIAL

## 2021-07-13 ENCOUNTER — APPOINTMENT (OUTPATIENT)
Dept: CT IMAGING | Age: 26
End: 2021-07-13
Payer: COMMERCIAL

## 2021-07-13 VITALS
RESPIRATION RATE: 18 BRPM | OXYGEN SATURATION: 98 % | DIASTOLIC BLOOD PRESSURE: 81 MMHG | SYSTOLIC BLOOD PRESSURE: 132 MMHG | TEMPERATURE: 98 F | HEART RATE: 84 BPM

## 2021-07-13 DIAGNOSIS — M54.9 ACUTE BACK PAIN, UNSPECIFIED BACK LOCATION, UNSPECIFIED BACK PAIN LATERALITY: ICD-10-CM
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
## 2021-07-13 PROCEDURE — 96372 THER/PROPH/DIAG INJ SC/IM: CPT

## 2021-07-13 PROCEDURE — 70450 CT HEAD/BRAIN W/O DYE: CPT

## 2021-07-13 PROCEDURE — 99284 EMERGENCY DEPT VISIT MOD MDM: CPT

## 2021-07-13 PROCEDURE — 72125 CT NECK SPINE W/O DYE: CPT

## 2021-07-13 PROCEDURE — 72131 CT LUMBAR SPINE W/O DYE: CPT

## 2021-07-13 PROCEDURE — 72128 CT CHEST SPINE W/O DYE: CPT

## 2021-07-13 PROCEDURE — 6360000002 HC RX W HCPCS: Performed by: PHYSICIAN ASSISTANT

## 2021-07-13 RX ORDER — CYCLOBENZAPRINE HCL 10 MG
10 TABLET ORAL 3 TIMES DAILY PRN
Qty: 15 TABLET | Refills: 0 | Status: SHIPPED | OUTPATIENT
Start: 2021-07-13

## 2021-07-13 RX ORDER — ORPHENADRINE CITRATE 30 MG/ML
60 INJECTION INTRAMUSCULAR; INTRAVENOUS ONCE
Status: COMPLETED | OUTPATIENT
Start: 2021-07-13 | End: 2021-07-13

## 2021-07-13 RX ORDER — LIDOCAINE 50 MG/G
1 PATCH TOPICAL DAILY
Qty: 30 PATCH | Refills: 0 | Status: SHIPPED | OUTPATIENT
Start: 2021-07-13 | End: 2021-08-12

## 2021-07-13 RX ORDER — IBUPROFEN 800 MG/1
800 TABLET ORAL EVERY 6 HOURS PRN
Qty: 30 TABLET | Refills: 0 | Status: SHIPPED | OUTPATIENT
Start: 2021-07-13

## 2021-07-13 RX ORDER — KETOROLAC TROMETHAMINE 30 MG/ML
60 INJECTION, SOLUTION INTRAMUSCULAR; INTRAVENOUS ONCE
Status: COMPLETED | OUTPATIENT
Start: 2021-07-13 | End: 2021-07-13

## 2021-07-13 RX ADMIN — KETOROLAC TROMETHAMINE 60 MG: 30 INJECTION, SOLUTION INTRAMUSCULAR; INTRAVENOUS at 21:17

## 2021-07-13 RX ADMIN — ORPHENADRINE CITRATE 60 MG: 60 INJECTION INTRAMUSCULAR; INTRAVENOUS at 21:18

## 2021-07-13 ASSESSMENT — PAIN SCALES - GENERAL: PAINLEVEL_OUTOF10: 9

## 2021-07-13 NOTE — ED NOTES
Bed: H-03  Expected date:   Expected time:   Means of arrival:   Comments:  EMS     Reanna Mckenzie  07/13/21 [id number]

## 2021-07-13 NOTE — LETTER
[facility] Emergency Department  [address] [id number]  Phone: [phone]  Fax: [phone]               July 13, 2021    Patient: Ashish Johnson   YOB: 1995   Date of Visit: 7/13/2021       To Whom It May Concern:    Jaci Dc was seen and treated in our emergency department on 7/13/2021. He may return to work on 7/16/2021.       Sincerely,       Nurse        Signature:__________________________________

## 2021-07-14 LAB
CULTURE: ABNORMAL
CULTURE: ABNORMAL
EKG ATRIAL RATE: 83 BPM
EKG DIAGNOSIS: NORMAL
EKG P AXIS: 23 DEGREES
EKG P-R INTERVAL: 150 MS
EKG Q-T INTERVAL: 362 MS
EKG QRS DURATION: 102 MS
EKG QTC CALCULATION (BAZETT): 425 MS
EKG R AXIS: 49 DEGREES
EKG T AXIS: 38 DEGREES
EKG VENTRICULAR RATE: 83 BPM
Lab: ABNORMAL
SPECIMEN: ABNORMAL
STREP A DIRECT SCREEN: NEGATIVE

## 2021-07-14 PROCEDURE — 93010 ELECTROCARDIOGRAM REPORT: CPT | Performed by: INTERNAL MEDICINE

## 2021-07-14 NOTE — ED NOTES
Pt. reviewed discharge instructions, follow up instructions, and new medications. Pt. Aware of medications sent to pharmacy. Pt. verbalizes understanding with no further questions. Pt. ambulatory and not showing any signs of distress.        Savannah Cortez RN  07/13/21 2122

## 2021-08-05 VITALS
DIASTOLIC BLOOD PRESSURE: 63 MMHG | TEMPERATURE: 98.2 F | OXYGEN SATURATION: 99 % | HEART RATE: 86 BPM | BODY MASS INDEX: 23.7 KG/M2 | HEIGHT: 69 IN | RESPIRATION RATE: 18 BRPM | WEIGHT: 160 LBS | SYSTOLIC BLOOD PRESSURE: 114 MMHG

## 2021-08-05 PROCEDURE — 96372 THER/PROPH/DIAG INJ SC/IM: CPT

## 2021-08-05 PROCEDURE — 99285 EMERGENCY DEPT VISIT HI MDM: CPT

## 2021-08-05 ASSESSMENT — PAIN SCALES - GENERAL: PAINLEVEL_OUTOF10: 7

## 2021-08-05 ASSESSMENT — PAIN DESCRIPTION - DESCRIPTORS: DESCRIPTORS: SHARP

## 2021-08-05 ASSESSMENT — PAIN DESCRIPTION - LOCATION: LOCATION: PENIS

## 2021-08-05 ASSESSMENT — PAIN DESCRIPTION - PAIN TYPE: TYPE: ACUTE PAIN

## 2021-08-06 ENCOUNTER — HOSPITAL ENCOUNTER (EMERGENCY)
Age: 26
Discharge: HOME | End: 2021-08-06
Attending: STUDENT IN AN ORGANIZED HEALTH CARE EDUCATION/TRAINING PROGRAM
Payer: COMMERCIAL

## 2021-08-06 DIAGNOSIS — N34.2 URETHRITIS: Primary | ICD-10-CM

## 2021-08-06 LAB
BACTERIA: NEGATIVE /HPF
BILIRUBIN URINE: NEGATIVE MG/DL
BLOOD, URINE: NEGATIVE
CLARITY: CLEAR
COLOR: YELLOW
GLUCOSE, URINE: NEGATIVE MG/DL
KETONES, URINE: NEGATIVE MG/DL
LEUKOCYTE ESTERASE, URINE: NEGATIVE
MUCUS: ABNORMAL HPF
NITRITE URINE, QUANTITATIVE: NEGATIVE
PH, URINE: 7 (ref 5–8)
PROTEIN UA: NEGATIVE MG/DL
RBC URINE: 2 /HPF (ref 0–3)
SPECIFIC GRAVITY UA: 1.01 (ref 1–1.03)
SQUAMOUS EPITHELIAL: <1 /HPF
TRICHOMONAS: ABNORMAL /HPF
UROBILINOGEN, URINE: NEGATIVE MG/DL (ref 0.2–1)
WBC UA: <1 /HPF (ref 0–2)

## 2021-08-06 PROCEDURE — 87591 N.GONORRHOEAE DNA AMP PROB: CPT

## 2021-08-06 PROCEDURE — 87491 CHLMYD TRACH DNA AMP PROBE: CPT

## 2021-08-06 PROCEDURE — 6370000000 HC RX 637 (ALT 250 FOR IP): Performed by: STUDENT IN AN ORGANIZED HEALTH CARE EDUCATION/TRAINING PROGRAM

## 2021-08-06 PROCEDURE — 6360000002 HC RX W HCPCS: Performed by: STUDENT IN AN ORGANIZED HEALTH CARE EDUCATION/TRAINING PROGRAM

## 2021-08-06 PROCEDURE — 81001 URINALYSIS AUTO W/SCOPE: CPT

## 2021-08-06 RX ORDER — DOXYCYCLINE HYCLATE 100 MG
100 TABLET ORAL ONCE
Status: COMPLETED | OUTPATIENT
Start: 2021-08-06 | End: 2021-08-06

## 2021-08-06 RX ORDER — DOXYCYCLINE HYCLATE 100 MG
100 TABLET ORAL 2 TIMES DAILY
Qty: 14 TABLET | Refills: 0 | Status: SHIPPED | OUTPATIENT
Start: 2021-08-06 | End: 2021-08-13

## 2021-08-06 RX ORDER — CEFTRIAXONE 500 MG/1
500 INJECTION, POWDER, FOR SOLUTION INTRAMUSCULAR; INTRAVENOUS ONCE
Status: COMPLETED | OUTPATIENT
Start: 2021-08-06 | End: 2021-08-06

## 2021-08-06 RX ADMIN — CEFTRIAXONE SODIUM 500 MG: 500 INJECTION, POWDER, FOR SOLUTION INTRAMUSCULAR; INTRAVENOUS at 02:52

## 2021-08-06 RX ADMIN — DOXYCYCLINE HYCLATE 100 MG: 100 TABLET ORAL at 02:53

## 2021-08-06 NOTE — ED NOTES
Reviewed discharge information. Patient verbalized understanding of paperwork, medication, and care instructions. Patient denied any questions.      Zoie Jones RN  08/06/21 [id number]
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
## 2021-08-09 LAB
CHLAMYDIA TRACHOMATIS AMPLIFIED DET: NEGATIVE
N GONORRHOEAE AMPLIFIED DET: NEGATIVE

## 2021-10-23 ENCOUNTER — HOSPITAL ENCOUNTER (EMERGENCY)
Age: 26
Discharge: HOME | End: 2021-10-23
Attending: EMERGENCY MEDICINE
Payer: COMMERCIAL

## 2021-10-23 VITALS
HEART RATE: 75 BPM | DIASTOLIC BLOOD PRESSURE: 75 MMHG | OXYGEN SATURATION: 98 % | SYSTOLIC BLOOD PRESSURE: 145 MMHG | TEMPERATURE: 98.7 F | RESPIRATION RATE: 18 BRPM

## 2021-10-23 DIAGNOSIS — F48.9 MENTAL HEALTH PROBLEM: Primary | ICD-10-CM

## 2021-10-23 DIAGNOSIS — R45.4 ANGER REACTION: ICD-10-CM

## 2021-10-23 PROCEDURE — 99283 EMERGENCY DEPT VISIT LOW MDM: CPT

## 2021-10-23 PROCEDURE — 99284 EMERGENCY DEPT VISIT MOD MDM: CPT | Performed by: PSYCHIATRY & NEUROLOGY
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
## 2021-10-23 NOTE — ED NOTES
Sitter at bedside, patient denies needs remains cooperative except changing out of clothing. Dr. Livier Menezes consulted.       Bryan Valadez, RN  10/23/21 3732

## 2021-10-23 NOTE — CONSULTS
Psychiatric Consult  Brittaney Hammonds  [phone]  10/23/2021  10/23/21      ID: Patient is a 27 y.o. male    CC: I was up set but better now    HPI:  Pt is a 28 yo  male who presents for exacerbation of adjustment D/O. Pt noted recent exacarbation of mood but feels safe on his current medications. Pt noted he currently feels safe and comfortable on the unit. Pt was in agreement with treatment team.  Pt was polite and cordial during the interview process. Pt noted he is doing [place] today. \"  Pt noted he is sleeping \"okay. ..about 6 hours last night. \"  Pt noted his apptetite is okay. Pt rated his depresssion a \"4,\" on a scale of zero to ten with ten being the worst and zero being none. Pt rated his anxiety a \"4,\" on the same scale. Pt denied any thoughts to harm himself or anyone else. Pt denied any auditory or visiual hallucintations. Pt denied any hx of seizures, TBIs, Hep C or HIV  No TD noted, AIMS=0,     Pt noted hx of previous inpt psychiatric admissions  Pt denied any previous suicide attempts  Pt denied any family hx of suicides  Pt denied any family mental health hx    Pt denied any hx of abuse trauma or neglect, physical sexual or emotional.      Alcohol: denies any current  Street drugs: denies any current  Tobacco: denies any current  Caffeine: 2-3 per day      Past Psychiatric History:   See note above       Family Psychiatric History:   History reviewed. No pertinent family history. Allergies:  No Known Allergies     OBJECTIVE  Vital Signs:  Vitals:    10/23/21 1331   BP: (!) 145/75   Pulse: 75   Resp: 18   Temp: 98.7 °F (37.1 °C)   SpO2: 98%       Labs:  No results found for this or any previous visit (from the past 48 hour(s)). Review of Systems:  Reports of no current cardiovascular, respiratory, gastrointestinal, genitourinary, integumentary, neurological, muscuoskeletal, or immunological symptoms today. PSYCHIATRIC: See HPI above.   Review of Systems:  Reports of no current cardiovascular, respiratory, gastrointestinal, genitourinary, integumentary, neurological, muscuoskeletal, or immunological symptoms today. PSYCHIATRIC: See HPI above. Neurologic examination:  Mental status: The patient is alert, attentive, and oriented. Speech is clear and fluent with good repetition, comprehension, and naming. She recalls 3/3 objects at 5 minutes. PSYCHIATRIC EXAMINATION / MENTAL STATUS EXAM         General appearance: [x] appears age, []  appears older than stated age,               [x]  adequately dressed and groomed, [] disheveled,               [x]  in no acute distress, [] appears mildly distressed, [] other           MUSCULOSKELETAL:   Gait:   [] normal, [] antalgic, [] unsteady, [x] gait not evaluated   Station:             [] erect, [] sitting, [x] recumbent, [] other        Strength/tone:  [x] muscle strength and tone appear consistent with age and                                        condition     [] atrophy      [] abnormal movements  PSYCHIATRIC:    Appearance: appears stated age. alert and oriented to person, place, time & situation. no acute distress. Adequate grooming and hygeine. Good eye contact. No prominent physical abnormalities. Attitude: Manner is cooperative and pleasant  Motor: No psychomotor agitation, retardation or abnormal movements noted  Speech: Clearly articulated; normal rate, volume, tone & amount. Language: intact understanding and production  Mood: okay  Affect: euthymic, full range, non-labile, congruent with mood and content of speech  Thought Production: Spontaneous. Thought Form: Coherent, linear, logical & goal-directed. No tangentiality or circumstantiality. No flight of ideas or loosening of associations. Thought Content/Perceptions: No SHORTY, no AVH, no delusion  Insight: fair  Judgment fair  Memory: Immediate, recent, and remote appear intact, though not formally tested.   Attention: maintained throughout interview  Fund of knowledge: Average  Gait/Balance: WNL/WNL           Impression:   Adjustment D/O    Problem List:   <principal problem not specified>    PT cleared per [facility] for D/C home once medically stable    Plan:  1. Reviewed treatment plan with patient including medication risks, benefits, side effects. Obtained informed consent for treatment. 2. Psychiatric management:medication initiation and titration, recommend outpt mental health follow up, safe and theraputic environment. 3. Status of problem/condition: ?Improving  4. Medical co-morbidities: Management per [facility] group, appreciate assistance  5. Legal Status: voluntary  6. The treatment team reviewed with the patient the diagnosis and treatment recommendations to include the risks, benefits, and side effects of chosen medications. 7. The patient verbalized understanding and agreed with the treatment regimen as outlined above. 8. Medical records, Labs, Diagnotic tests reviewed  9. Interval History. 10. Review current labs  11. Continue current medications  12. Supportive Therapy Provided  13. Pt had an opportunity to ask questions and address concerns  14. Pt encouraged to continue outpt  Therapy. 15. Pt was in agreement with treatment plan. 16. The risks benefits and side effects of medications were discussed with the patient, including alternatives and no treatment.

## 2021-10-23 NOTE — ED NOTES
Patient being discharged at this time after speaking to Dr. Tiffani Sorto.       Enoc Reynoso RN  10/23/21 7242

## 2021-12-09 ENCOUNTER — APPOINTMENT (OUTPATIENT)
Dept: GENERAL RADIOLOGY | Age: 26
End: 2021-12-09
Payer: COMMERCIAL

## 2021-12-09 ENCOUNTER — HOSPITAL ENCOUNTER (EMERGENCY)
Age: 26
Discharge: HOME | End: 2021-12-09
Payer: COMMERCIAL

## 2021-12-09 VITALS
TEMPERATURE: 98 F | RESPIRATION RATE: 14 BRPM | OXYGEN SATURATION: 99 % | DIASTOLIC BLOOD PRESSURE: 70 MMHG | SYSTOLIC BLOOD PRESSURE: 150 MMHG | HEART RATE: 88 BPM

## 2021-12-09 DIAGNOSIS — S62.345A CLOSED NONDISPLACED FRACTURE OF BASE OF FOURTH METACARPAL BONE OF LEFT HAND, INITIAL ENCOUNTER: Primary | ICD-10-CM

## 2021-12-09 PROCEDURE — 99283 EMERGENCY DEPT VISIT LOW MDM: CPT

## 2021-12-09 PROCEDURE — 29125 APPL SHORT ARM SPLINT STATIC: CPT

## 2021-12-09 PROCEDURE — 73130 X-RAY EXAM OF HAND: CPT

## 2021-12-09 RX ORDER — HYDROCODONE BITARTRATE AND ACETAMINOPHEN 5; 325 MG/1; MG/1
1 TABLET ORAL ONCE
Status: DISCONTINUED | OUTPATIENT
Start: 2021-12-09 | End: 2021-12-09 | Stop reason: HOSPADM

## 2021-12-09 RX ORDER — HYDROCODONE BITARTRATE AND ACETAMINOPHEN 5; 325 MG/1; MG/1
1 TABLET ORAL EVERY 6 HOURS PRN
Qty: 15 TABLET | Refills: 0 | Status: SHIPPED | OUTPATIENT
Start: 2021-12-09 | End: 2021-12-16

## 2021-12-09 ASSESSMENT — PAIN SCALES - GENERAL: PAINLEVEL_OUTOF10: 10

## 2021-12-09 NOTE — LETTER
[facility] Emergency Department  [address] [place] [id number]  Phone: [phone]  Fax: [phone]               December 9, 2021    Patient: Juan Capone   YOB: 1995   Date of Visit: 12/9/2021       To Whom It May Concern:    Moi Dasilva was seen and treated in our emergency department on 12/9/2021. He may return to work on 12/10/21.       Sincerely,       Fouzia Hendrickson PA-C         Signature:__________________________________
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
## 2021-12-29 ENCOUNTER — OFFICE VISIT (OUTPATIENT)
Dept: ORTHOPEDIC SURGERY | Age: 26
End: 2021-12-29
Payer: COMMERCIAL

## 2021-12-29 VITALS
BODY MASS INDEX: 23.7 KG/M2 | RESPIRATION RATE: 16 BRPM | HEART RATE: 72 BPM | OXYGEN SATURATION: 98 % | HEIGHT: 69 IN | WEIGHT: 160 LBS

## 2021-12-29 DIAGNOSIS — S62.315A CLOSED DISPLACED FRACTURE OF BASE OF FOURTH METACARPAL BONE OF LEFT HAND, INITIAL ENCOUNTER: Primary | ICD-10-CM

## 2021-12-29 PROCEDURE — 99203 OFFICE O/P NEW LOW 30 MIN: CPT | Performed by: PHYSICIAN ASSISTANT

## 2021-12-29 PROCEDURE — 26600 TREAT METACARPAL FRACTURE: CPT | Performed by: PHYSICIAN ASSISTANT

## 2021-12-29 NOTE — PATIENT INSTRUCTIONS
Velcro wrist given in office today  Nonweightbearing  No heavy lifting pushing or pulling  Please keep brace on at all times besides hygiene  Follow up in 3 weeks x-ray at next visit

## 2022-01-04 ASSESSMENT — ENCOUNTER SYMPTOMS
EYES NEGATIVE: 1
GASTROINTESTINAL NEGATIVE: 1
RESPIRATORY NEGATIVE: 1
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
## 2022-07-04 ENCOUNTER — HOSPITAL ENCOUNTER (EMERGENCY)
Age: 27
Discharge: HOME | End: 2022-07-05
Payer: COMMERCIAL

## 2022-07-04 DIAGNOSIS — R11.2 NAUSEA VOMITING AND DIARRHEA: ICD-10-CM

## 2022-07-04 DIAGNOSIS — Z20.822 ENCOUNTER FOR SCREENING LABORATORY TESTING FOR COVID-19 VIRUS: Primary | ICD-10-CM

## 2022-07-04 DIAGNOSIS — R51.9 NONINTRACTABLE HEADACHE, UNSPECIFIED CHRONICITY PATTERN, UNSPECIFIED HEADACHE TYPE: ICD-10-CM

## 2022-07-04 DIAGNOSIS — R19.7 NAUSEA VOMITING AND DIARRHEA: ICD-10-CM

## 2022-07-04 DIAGNOSIS — R10.9 ABDOMINAL PAIN, UNSPECIFIED ABDOMINAL LOCATION: ICD-10-CM

## 2022-07-04 LAB
SARS-COV-2, NAAT: NOT DETECTED
SOURCE: NORMAL

## 2022-07-04 PROCEDURE — 99283 EMERGENCY DEPT VISIT LOW MDM: CPT

## 2022-07-04 PROCEDURE — 87635 SARS-COV-2 COVID-19 AMP PRB: CPT

## 2022-07-04 RX ORDER — DICYCLOMINE HYDROCHLORIDE 10 MG/1
10 CAPSULE ORAL
Qty: 20 CAPSULE | Refills: 0 | Status: SHIPPED | OUTPATIENT
Start: 2022-07-04

## 2022-07-04 RX ORDER — ONDANSETRON 4 MG/1
4 TABLET, ORALLY DISINTEGRATING ORAL EVERY 8 HOURS PRN
Qty: 15 TABLET | Refills: 0 | Status: SHIPPED | OUTPATIENT
Start: 2022-07-04

## 2022-07-04 RX ORDER — BUTALBITAL, ACETAMINOPHEN AND CAFFEINE 50; 325; 40 MG/1; MG/1; MG/1
1 TABLET ORAL EVERY 4 HOURS PRN
Qty: 20 TABLET | Refills: 0 | Status: SHIPPED | OUTPATIENT
Start: 2022-07-04

## 2022-07-04 NOTE — LETTER
[facility]  [address] [address]  Phone: [phone]  Fax: [phone]             July 5, 2022    Patient: Mami Roche   YOB: 1995   Date of Visit: 7/4/2022       To Whom It May Concern:    Saji Ford was seen and treated in our emergency department on 7/4/2022. He may return to work on 7/5/2022.       Sincerely,             Signature:__________________________________

## 2022-07-05 VITALS
BODY MASS INDEX: 23.7 KG/M2 | OXYGEN SATURATION: 99 % | DIASTOLIC BLOOD PRESSURE: 65 MMHG | SYSTOLIC BLOOD PRESSURE: 129 MMHG | TEMPERATURE: 98.1 F | WEIGHT: 160 LBS | HEIGHT: 69 IN | RESPIRATION RATE: 18 BRPM | HEART RATE: 69 BPM

## 2022-07-05 NOTE — ED PROVIDER NOTES
eMERGENCY dEPARTMENT eNCOUnter         [address] [facility]    PCP: No primary care provider on file. CHIEF COMPLAINT    Chief Complaint   Patient presents with    Headache    Covid Testing       HPI    Claudia Mahan is a 27 y.o. male who presents for COVID-19 testing with headache, abdominal pain nausea vomiting diarrhea. Context is patient has had sick contact with brother who did test positive for COVID-19. States symptoms began 3 days ago with generalized headache, fatigue, decreased appetite. Has had intermittent cramping abdominal pain with nausea and nonbilious/nonbloody vomiting as well as loose nonbloody stools over the last 24 hours. States that he called into work and told him he had the symptoms, work directed him to ER for COVID-19 rule out. Patient is vaccinated for COVID-19. Denies any documented fevers. No neck pain or stiffness. Denying any numbness tingling or weakness in the upper or lower extremities. Has not tried any over-the-counter medications for relief of symptoms. Any chest pain or shortness of breath. Is endorsing rhinorrhea without sore throat, nasal congestion or pain with swallowing. REVIEW OF SYSTEMS    Constitutional:  No fever, chills  HEENT:  See above  Cardiovascular:  Denies chest pain, palpitations.   Respiratory:  Denies wheezes or shortness of breath   GI:  See HPI  :  Denies any urinary symptoms   Neurologic:  Denies confusion, light headedness, dizziness, or syncope       All other review of systems are negative  See HPI and nursing notes for additional information       PAST MEDICAL AND SURGICAL HISTORY    Past Medical History:   Diagnosis Date    ADD (attention deficit disorder)     ADHD (attention deficit hyperactivity disorder)     Asthma     Bipolar 1 disorder ([facility] [address]     Murmur     Staph aureus infection      Past Surgical History:   Procedure Laterality Date    HAND SURGERY Right CURRENT MEDICATIONS    Current Outpatient Rx   Medication Sig Dispense Refill    butalbital-acetaminophen-caffeine (FIORICET, ESGIC) -40 MG per tablet Take 1 tablet by mouth every 4 hours as needed for Headaches 20 tablet 0    ondansetron (ZOFRAN ODT) 4 MG disintegrating tablet Take 1 tablet by mouth every 8 hours as needed for Nausea 15 tablet 0    dicyclomine (BENTYL) 10 MG capsule Take 1 capsule by mouth 4 times daily (before meals and nightly) 20 capsule 0    ibuprofen (ADVIL;MOTRIN) 800 MG tablet Take 1 tablet by mouth every 6 hours as needed for Pain (Patient not taking: Reported on 12/29/2021) 30 tablet 0    cyclobenzaprine (FLEXERIL) 10 MG tablet Take 1 tablet by mouth 3 times daily as needed for Muscle spasms (Patient not taking: Reported on 12/29/2021) 15 tablet 0    Pseudoephedrine-DM-GG 60- MG TABS Take 1 tablet by mouth every 6 hours (Patient not taking: Reported on 12/29/2021) 20 tablet 0    Benzocaine-Menthol (CEPACOL) 15-2.3 MG LOZG Take 1 lozenge by mouth every 3 hours as needed (pain) (Patient not taking: Reported on 12/29/2021) 30 lozenge 0    hydrOXYzine (VISTARIL) 25 MG capsule Take 1 capsule by mouth 3 times daily as needed for Anxiety (Patient not taking: Reported on 12/29/2021) 20 capsule 0       ALLERGIES    No Known Allergies    FAMILY AND SOCIAL HISTORY    History reviewed. No pertinent family history.   Social History     Socioeconomic History    Marital status: Single     Spouse name: None    Number of children: None    Years of education: None    Highest education level: None   Occupational History    None   Tobacco Use    Smoking status: Never Smoker    Smokeless tobacco: Never Used   Vaping Use    Vaping Use: Never used   Substance and Sexual Activity    Alcohol use: Yes     Comment: had 2 beers today, doesnt usually drink    Drug use: Yes     Types: Marijuana Mary Lloyd)     Comment: daily    Sexual activity: None   Other Topics Concern    None Social History Narrative    None     Social Determinants of Health     Financial Resource Strain:     Difficulty of Paying Living Expenses: Not on file   Food Insecurity:     Worried About Running Out of Food in the Last Year: Not on file    Jorge of Food in the Last Year: Not on file   Transportation Needs:     Lack of Transportation (Medical): Not on file    Lack of Transportation (Non-Medical): Not on file   Physical Activity:     Days of Exercise per Week: Not on file    Minutes of Exercise per Session: Not on file   Stress:     Feeling of Stress : Not on file   Social Connections:     Frequency of Communication with Friends and Family: Not on file    Frequency of Social Gatherings with Friends and Family: Not on file    Attends Quaker Services: Not on file    Active Member of [address] [place] [address] [employer] or Organizations: Not on file    Attends Club or Organization Meetings: Not on file    Marital Status: Not on file   Intimate Partner Violence:     Fear of Current or Ex-Partner: Not on file    Emotionally Abused: Not on file    Physically Abused: Not on file    Sexually Abused: Not on file   Housing Stability:     Unable to Pay for Housing in the Last Year: Not on file    Number of [place] in the Last Year: Not on file    Unstable Housing in the Last Year: Not on file       PHYSICAL EXAM    VITAL SIGNS: /70   Pulse 97   Temp 99.1 °F (37.3 °C) (Oral)   Resp 18   Ht 5' 9\" (1.753 m)   Wt 160 lb (72.6 kg)   SpO2 97%   BMI 23.63 kg/m²   Constitutional:  Well developed, well nourished, no acute distress, non-toxic appearance   Eyes:    -PERRL, EOM intact. Conjunctiva normal, sclera non-icteric  HEENT:     - Normocephalic, atraumatic     -  Frontal/Maxillary sinuses NONtender to percussion.   - External auditory canals patent, nonerythematous   - TMs intact, opaque without discharge, fluid, or bulging.   - Nasal passages patent, boggy turbinates. +clear rhinnorhea     No massess.    - Oropharynx is patent, noninjected without tonsillar hypertrophy or exudate. Uvula is midline. No trismus. Tolerating secretions. Neck/Lymphatics:    - supple, no JVD, no swollen nodes     Respiratory:  Non labored breathing. Clear to auscultation bilateral lung fields, no wheezes/rales/rhonchi. No retractions, no accessory muscle use  Cardiovascular:  Normal rate, normal rhythm   GI:   No gross discoloration. Bowel sounds present in all quadrants, No audible bruits. Soft,  Nondistended. No localized abdominal tenderness without rebound tenderness or guarding, No palpable pulsatile masses or obvious hernias. No McBurney's point tenderness  Negative Rovsing sign   Negative Mckeon's sign. Back:   No CVA tenderness to percussion. Musculoskeletal:  No obvious deficits, no edema   Integument:  Skin pink, warn, dry, intact   Neurologic:    - Alert & oriented x3, no slurred speech  - No facial drop  - No obvious gross motor deficits  - Cranial nerves II-XII grossly intact  - Normal finger to nose test bilaterally  - Rapid alternating movements/heel to shin testing intact and equal  - Upper and lower extremity DTRs intact and equal  - 5/5 strength of upper and lower extremities with  and dorsi/plantar flexion on resistance  - Sensation equal and intact to light/sharp touch  - Brudzinski and Kernig's signs negative  - No pronator drift  - Patient ambulates in the ED with a steady gait, No pronator drift. LABS:  Results for orders placed or performed during the hospital encounter of 07/04/22   COVID-19, Rapid    Specimen: Nasopharyngeal   Result Value Ref Range    Source THROAT     SARS-CoV-2, NAAT NOT DETECTED NOT DETECTED         RADIOLOGY/PROCEDURES    NONE      ED COURSE & MEDICAL DECISION MAKING       Vital signs and nursing notes reviewed during ED course. I have independently evaluated this patient .   Supervising MD - Dr. Elizabeth Adrian - present in the Emergency Department, available for consultation, throughout entirety of  patient care. All pertinent Lab data and radiographic results reviewed with patient at bedside. The patient and/or the family were informed of the results of any tests/labs/imaging, the treatment plan, and time was allotted to answer questions. Differential Diagnoses:  Acute Bronchitis, Pneumonia, Airway Obstruction, Upper Respiratory Viral infection, Sinusitis, Pharyngitis, Amrita-Tonsillar Abscess,    Clinical  IMPRESSION    1. Encounter for screening laboratory testing for COVID-19 virus    2. Nonintractable headache, unspecified chronicity pattern, unspecified headache type    3. Abdominal pain, unspecified abdominal location    4. Nausea vomiting and diarrhea      Patient presents for COVID-19 test with 3-day history of generalized headache, abdominal pain nausea vomiting diarrhea. Work-up was initiated triage. Initially evaluated patient while he was still in the waiting room triage room. On exam, patient resting comfortably nontoxic afebrile in no acute respiratory distress. Lungs clear to auscultation. Abdominal exam is nonsurgical.  Nonfocal neurological exam.  No nuchal rigidity. He is ambulating to the ED with steady gait. Rapid COVID ordered at triage is nondetectable. Discussed with patient likely other viral etiology for presenting symptoms however given his abdominal pain and headache, did discuss/offer further work-up including labs and medications. Patient is declining further work-up at this time stating \"I do not want to wait here any longer. \"  We discussed discharge with symptomatic management and medications and he is given a low threshold to return back to the ED for any new or worsening symptoms. Low clinical suspicion at this time for acute surgical abdomen, sepsis, pneumonia, meningitis/encephalitis, subarachnoid hemorrhage. Patient is discharged stable condition with Fioricet, Zofran and Bentyl.   Encourage rest, pushing clear fluids, bland/brat diet with gradual advancement to normal diet as tolerated. Patient does not have PCP so I have given him/her doctor of the day contact information and encourage him/her to establish care and followup in the next 1-2 days. Return warnings back to the ED discussed. Discussed need for serial abdominal exams in the next 12 to 24 hours, sooner with any new or worsening symptoms. Diagnosis and plan discussed in detail with patient who understands and agrees. Patient agrees to return emergency department if symptoms worsen or any new symptoms develop. Comment: Please note this report has been produced using speech recognition software and may contain errors related to that system including errors in grammar, punctuation, and spelling, as well as words and phrases that may be inappropriate. If there are any questions or concerns please feel free to contact the dictating provider for clarification.         Carlota Castro PA-C  07/04/22 8067

## 2022-07-05 NOTE — ED NOTES
Patient presents to Ed with complaints of headache and diarrhea reports that he was sent here from his place of employment for a covid test.        Donald Marion RN  07/04/22 1318

## 2022-07-05 NOTE — ED NOTES
Patient given discharge instructions medications and follow up care reviewed.  Patient voiced understanding         Cristiana John, DEANN  07/05/22 2155

## 2022-12-21 ENCOUNTER — APPOINTMENT (OUTPATIENT)
Dept: GENERAL RADIOLOGY | Age: 27
End: 2022-12-21
Payer: COMMERCIAL

## 2022-12-21 ENCOUNTER — HOSPITAL ENCOUNTER (EMERGENCY)
Age: 27
Discharge: HOME | End: 2022-12-21
Attending: EMERGENCY MEDICINE
Payer: COMMERCIAL

## 2022-12-21 VITALS
HEART RATE: 70 BPM | RESPIRATION RATE: 17 BRPM | OXYGEN SATURATION: 97 % | SYSTOLIC BLOOD PRESSURE: 117 MMHG | DIASTOLIC BLOOD PRESSURE: 63 MMHG | TEMPERATURE: 97.7 F

## 2022-12-21 DIAGNOSIS — R07.89 STERNUM PAIN: Primary | ICD-10-CM

## 2022-12-21 LAB
ALBUMIN SERPL-MCNC: 4.7 GM/DL (ref 3.4–5)
ALP BLD-CCNC: 86 IU/L (ref 40–129)
ALT SERPL-CCNC: 33 U/L (ref 10–40)
ANION GAP SERPL CALCULATED.3IONS-SCNC: 12 MMOL/L (ref 4–16)
AST SERPL-CCNC: 20 IU/L (ref 15–37)
BASOPHILS ABSOLUTE: 0.1 K/CU MM
BASOPHILS RELATIVE PERCENT: 0.8 % (ref 0–1)
BILIRUB SERPL-MCNC: 0.7 MG/DL (ref 0–1)
BUN BLDV-MCNC: 21 MG/DL (ref 6–23)
CALCIUM SERPL-MCNC: 9.7 MG/DL (ref 8.3–10.6)
CHLORIDE BLD-SCNC: 104 MMOL/L (ref 99–110)
CO2: 22 MMOL/L (ref 21–32)
CREAT SERPL-MCNC: 0.9 MG/DL (ref 0.9–1.3)
DIFFERENTIAL TYPE: ABNORMAL
EOSINOPHILS ABSOLUTE: 0.2 K/CU MM
EOSINOPHILS RELATIVE PERCENT: 3.5 % (ref 0–3)
GFR SERPL CREATININE-BSD FRML MDRD: >60 ML/MIN/1.73M2
GLUCOSE BLD-MCNC: 113 MG/DL (ref 70–99)
HCT VFR BLD CALC: 41.7 % (ref 42–52)
HEMOGLOBIN: 14.9 GM/DL (ref 13.5–18)
IMMATURE NEUTROPHIL %: 0.2 % (ref 0–0.43)
LYMPHOCYTES ABSOLUTE: 1.5 K/CU MM
LYMPHOCYTES RELATIVE PERCENT: 24 % (ref 24–44)
MCH RBC QN AUTO: 31.3 PG (ref 27–31)
MCHC RBC AUTO-ENTMCNC: 35.7 % (ref 32–36)
MCV RBC AUTO: 87.6 FL (ref 78–100)
MONOCYTES ABSOLUTE: 0.5 K/CU MM
MONOCYTES RELATIVE PERCENT: 7.5 % (ref 0–4)
NUCLEATED RBC %: 0 %
PDW BLD-RTO: 11.8 % (ref 11.7–14.9)
PLATELET # BLD: 167 K/CU MM (ref 140–440)
PMV BLD AUTO: 10.3 FL (ref 7.5–11.1)
POTASSIUM SERPL-SCNC: 3.7 MMOL/L (ref 3.5–5.1)
RBC # BLD: 4.76 M/CU MM (ref 4.6–6.2)
SEGMENTED NEUTROPHILS ABSOLUTE COUNT: 4 K/CU MM
SEGMENTED NEUTROPHILS RELATIVE PERCENT: 64 % (ref 36–66)
SODIUM BLD-SCNC: 138 MMOL/L (ref 135–145)
TOTAL IMMATURE NEUTOROPHIL: 0.01 K/CU MM
TOTAL NUCLEATED RBC: 0 K/CU MM
TOTAL PROTEIN: 7.4 GM/DL (ref 6.4–8.2)
TROPONIN T: <0.01 NG/ML
WBC # BLD: 6.3 K/CU MM (ref 4–10.5)

## 2022-12-21 PROCEDURE — 71045 X-RAY EXAM CHEST 1 VIEW: CPT

## 2022-12-21 PROCEDURE — 84484 ASSAY OF TROPONIN QUANT: CPT

## 2022-12-21 PROCEDURE — 80053 COMPREHEN METABOLIC PANEL: CPT

## 2022-12-21 PROCEDURE — 85025 COMPLETE CBC W/AUTO DIFF WBC: CPT

## 2022-12-21 PROCEDURE — 99285 EMERGENCY DEPT VISIT HI MDM: CPT

## 2022-12-21 PROCEDURE — 93005 ELECTROCARDIOGRAM TRACING: CPT | Performed by: EMERGENCY MEDICINE

## 2022-12-21 RX ORDER — NAPROXEN 500 MG/1
500 TABLET ORAL 2 TIMES DAILY WITH MEALS
Qty: 60 TABLET | Refills: 0 | Status: SHIPPED | OUTPATIENT
Start: 2022-12-21

## 2022-12-21 RX ORDER — LIDOCAINE 50 MG/G
1 PATCH TOPICAL DAILY
Qty: 10 PATCH | Refills: 0 | Status: SHIPPED | OUTPATIENT
Start: 2022-12-21 | End: 2022-12-31

## 2022-12-21 RX ORDER — ACETAMINOPHEN 500 MG
500 TABLET ORAL 4 TIMES DAILY PRN
Qty: 60 TABLET | Refills: 0 | Status: SHIPPED | OUTPATIENT
Start: 2022-12-21

## 2022-12-21 ASSESSMENT — PAIN SCALES - GENERAL: PAINLEVEL_OUTOF10: 10

## 2022-12-21 ASSESSMENT — PAIN DESCRIPTION - FREQUENCY: FREQUENCY: INTERMITTENT

## 2022-12-21 ASSESSMENT — PAIN DESCRIPTION - DESCRIPTORS: DESCRIPTORS: SHARP

## 2022-12-21 ASSESSMENT — PAIN DESCRIPTION - ORIENTATION: ORIENTATION: MID

## 2022-12-21 ASSESSMENT — PAIN DESCRIPTION - LOCATION: LOCATION: CHEST

## 2022-12-21 ASSESSMENT — PAIN DESCRIPTION - PAIN TYPE: TYPE: ACUTE PAIN

## 2022-12-21 NOTE — Clinical Note
Zaida Turk was seen and treated in our emergency department on 12/21/2022. He may return to work on 12/23/2022. If you have any questions or concerns, please don't hesitate to call.       Anita Trivedi, DO

## 2022-12-22 LAB
EKG ATRIAL RATE: 70 BPM
EKG DIAGNOSIS: NORMAL
EKG P AXIS: 43 DEGREES
EKG P-R INTERVAL: 144 MS
EKG Q-T INTERVAL: 372 MS
EKG QRS DURATION: 102 MS
EKG QTC CALCULATION (BAZETT): 401 MS
EKG R AXIS: 77 DEGREES
EKG T AXIS: 57 DEGREES
EKG VENTRICULAR RATE: 70 BPM

## 2022-12-22 NOTE — ED PROVIDER NOTES
Emergency Department Encounter    Patient: Bernie Benavidez  MRN: [id number]  : 1995  Date of Evaluation: 2022  ED Provider:  Francisco Walton DO    Triage Chief Complaint:   Chest Pain (Started around 12)    [place]:  Bernie Benavidez is a 24 y.o. male that presents to the emergency department for evaluation of pain in his sternum. Patient states he woke up this morning with pain in his lower sternum tender to palpation. He was unsure if he pulled anything. Patient states he does not lifting pulling or straining at work. He states he was working 15 to 16-hour days lately. Patient states he did not take anything for pain this morning when he felt the pain when he went to work. Patient states he was talked about the pain pressed on his thumb and almost passed out due to severe pain. He states at that time he did not take any pain medications either. He states no falls injury or trauma that he knows of. He states no numbness and tingling extremity no dizziness lightheaded no fevers chills cough. No history reflux ulcers gastritis no abdominal pain no nausea vomiting diarrhea no history of heart disease stents open heart surgery no hypertension hyperlipidemia diabetes. Stated pain 8 out of 10 on the pain scale. States that on any medication no allergies no drugs alcohol tobacco.  States he has no family doctor. Very presents to the ER with mom and dad for evaluation.     ROS - see HPI, below listed is current ROS at time of my eval:  General:  No fevers, no chills, no weakness  Eyes:  No recent vison changes, no discharge  ENT:  No sore throat, no nasal congestion, no hearing changes  Cardiovascular:  No chest pain, no palpitations  Respiratory:  No shortness of breath, no cough, no wheezing  Gastrointestinal:  No pain, no nausea, no vomiting, no diarrhea  Musculoskeletal: Positive for sternum pain no muscle pain, no joint pain  Skin:  No rash, no pruritis, no easy bruising  Neurologic: No speech problems, no headache, no extremity numbness, no extremity tingling, no extremity weakness  Psychiatric:  No anxiety  Genitourinary:  No dysuria, no hematuria  Endocrine:  No unexpected weight gain, no unexpected weight loss  Extremities:  no edema, no pain    Past Medical History:   Diagnosis Date    ADD (attention deficit disorder)     ADHD (attention deficit hyperactivity disorder)     Asthma     Bipolar 1 disorder ([facility] [address]     Murmur     Staph aureus infection      Past Surgical History:   Procedure Laterality Date    HAND SURGERY Right      History reviewed. No pertinent family history. Social History     Socioeconomic History    Marital status: Single     Spouse name: Not on file    Number of children: Not on file    Years of education: Not on file    Highest education level: Not on file   Occupational History    Not on file   Tobacco Use    Smoking status: Never    Smokeless tobacco: Never   Vaping Use    Vaping Use: Never used   Substance and Sexual Activity    Alcohol use: Yes     Comment: had 2 beers today, doesnt usually drink    Drug use: Yes     Types: Marijuana Dietra Due)     Comment: daily    Sexual activity: Not on file   Other Topics Concern    Not on file   Social History Narrative    Not on file     Social Determinants of Health     Financial Resource Strain: Not on file   Food Insecurity: Not on file   Transportation Needs: Not on file   Physical Activity: Not on file   Stress: Not on file   Social Connections: Not on file   Intimate Partner Violence: Not on file   Housing Stability: Not on file     No current facility-administered medications for this encounter. Current Outpatient Medications   Medication Sig Dispense Refill    naproxen (NAPROSYN) 500 MG tablet Take 1 tablet by mouth 2 times daily (with meals) 60 tablet 0    lidocaine (LIDODERM) 5 % Place 1 patch onto the skin daily for 10 days 12 hours on, 12 hours off.  10 patch 0    acetaminophen (TYLENOL) 500 MG tablet Take 1 tablet by mouth 4 times daily as needed for Pain 60 tablet 0    butalbital-acetaminophen-caffeine (FIORICET, ESGIC) -40 MG per tablet Take 1 tablet by mouth every 4 hours as needed for Headaches 20 tablet 0    ondansetron (ZOFRAN ODT) 4 MG disintegrating tablet Take 1 tablet by mouth every 8 hours as needed for Nausea 15 tablet 0    dicyclomine (BENTYL) 10 MG capsule Take 1 capsule by mouth 4 times daily (before meals and nightly) 20 capsule 0    ibuprofen (ADVIL;MOTRIN) 800 MG tablet Take 1 tablet by mouth every 6 hours as needed for Pain (Patient not taking: Reported on 12/29/2021) 30 tablet 0    cyclobenzaprine (FLEXERIL) 10 MG tablet Take 1 tablet by mouth 3 times daily as needed for Muscle spasms (Patient not taking: Reported on 12/29/2021) 15 tablet 0    Pseudoephedrine-DM-GG 60- MG TABS Take 1 tablet by mouth every 6 hours (Patient not taking: Reported on 12/29/2021) 20 tablet 0    Benzocaine-Menthol (CEPACOL) 15-2.3 MG LOZG Take 1 lozenge by mouth every 3 hours as needed (pain) (Patient not taking: Reported on 12/29/2021) 30 lozenge 0    hydrOXYzine (VISTARIL) 25 MG capsule Take 1 capsule by mouth 3 times daily as needed for Anxiety (Patient not taking: Reported on 12/29/2021) 20 capsule 0     No Known Allergies    Nursing Notes Reviewed    Physical Exam:  Triage VS:    ED Triage Vitals [12/21/22 1626]   Enc Vitals Group      BP (!) 110/59      Heart Rate 75      Resp 16      Temp 97.7 °F (36.5 °C)      Temp Source Oral      SpO2 96 %      Weight       Height       Head Circumference       Peak Flow       Pain Score       Pain Loc       Pain Edu? Excl. in [address]? /63   Pulse 70   Temp 97.7 °F (36.5 °C) (Oral)   Resp 17   SpO2 97%     My pulse ox interpretation is - normal    General appearance:  No acute distress. Skin:  Warm. Dry. Eye:  Extraocular movements intact. Ears, nose, mouth and throat:  Oral mucosa moist   Neck:  Trachea midline. Extremity:  No swelling.   Normal ROM Heart:  Regular rate and rhythm, normal S1 & S2, no extra heart sounds. Perfusion:  intact  Respiratory:  Lungs clear to auscultation bilaterally. Respirations nonlabored. Abdominal:  Normal bowel sounds. Soft. Nontender. Non distended. Back:  No CVA tenderness to palpation     Neurological:  Alert and oriented times 3. No focal neuro deficits.              Psychiatric:  Appropriate    I have reviewed and interpreted all of the currently available lab results from this visit (if applicable):  Results for orders placed or performed during the hospital encounter of 12/21/22   CBC with Auto Differential   Result Value Ref Range    WBC 6.3 4.0 - 10.5 K/CU MM    RBC 4.76 4.6 - 6.2 M/CU MM    Hemoglobin 14.9 13.5 - 18.0 GM/DL    Hematocrit 41.7 (L) 42 - 52 %    MCV 87.6 78 - 100 FL    MCH 31.3 (H) 27 - 31 PG    MCHC 35.7 32.0 - 36.0 %    RDW 11.8 11.7 - 14.9 %    Platelets 154 371 - 021 K/CU MM    MPV 10.3 7.5 - 11.1 FL    Differential Type AUTOMATED DIFFERENTIAL     Segs Relative 64.0 36 - 66 %    Lymphocytes % 24.0 24 - 44 %    Monocytes % 7.5 (H) 0 - 4 %    Eosinophils % 3.5 (H) 0 - 3 %    Basophils % 0.8 0 - 1 %    Segs Absolute 4.0 K/CU MM    Lymphocytes Absolute 1.5 K/CU MM    Monocytes Absolute 0.5 K/CU MM    Eosinophils Absolute 0.2 K/CU MM    Basophils Absolute 0.1 K/CU MM    Nucleated RBC % 0.0 %    Total Nucleated RBC 0.0 K/CU MM    Total Immature Neutrophil 0.01 K/CU MM    Immature Neutrophil % 0.2 0 - 0.43 %   Comprehensive Metabolic Panel   Result Value Ref Range    Sodium 138 135 - 145 MMOL/L    Potassium 3.7 3.5 - 5.1 MMOL/L    Chloride 104 99 - 110 mMol/L    CO2 22 21 - 32 MMOL/L    BUN 21 6 - 23 MG/DL    Creatinine 0.9 0.9 - 1.3 MG/DL    Est, Glom Filt Rate >60 >60 mL/min/1.73m2    Glucose 113 (H) 70 - 99 MG/DL    Calcium 9.7 8.3 - 10.6 MG/DL    Albumin 4.7 3.4 - 5.0 GM/DL    Total Protein 7.4 6.4 - 8.2 GM/DL    Total Bilirubin 0.7 0.0 - 1.0 MG/DL    ALT 33 10 - 40 U/L    AST 20 15 - 37 IU/L    Alkaline Phosphatase 86 40 - 129 IU/L    Anion Gap 12 4 - 16   Troponin   Result Value Ref Range    Troponin T <0.010 <0.01 NG/ML      Radiographs (if obtained):  Radiologist's Report Reviewed:  XR CHEST 1 VIEW    Result Date: 12/21/2022  EXAMINATION: ONE XRAY VIEW OF THE CHEST 12/21/2022 5:37 pm COMPARISON: 07/12/2021 HISTORY: ORDERING SYSTEM PROVIDED HISTORY: chest pain TECHNOLOGIST PROVIDED HISTORY: Reason for exam:->chest pain Reason for Exam: chest pain Additional signs and symptoms: na Relevant Medical/Surgical History: asthma FINDINGS: The lungs are without acute focal process. There is no effusion or pneumothorax. The cardiomediastinal silhouette is without acute process. The osseous structures are without acute process. No acute process. EKG (if obtained): (All EKG's are interpreted by myself in the absence of a cardiologist)      MDM:  Patient presents to the emergency department for lower sternum pain tender palpation no known injury but does do a lot of heavy lifting pulling or straining at work. Patient no risk factors for cardiac disease. Patient pain muscle skeletal and tender to palpation on exam.  Patient was ordered an EKG which showed no ST elevations. Chest x-ray no acute process. Negative troponin. Normal white count hemoglobin platelets normal sodium potassium kidney function liver enzymes lipase. I discussed with patient pain likely related to muscle skeletal sternal pain and not sure what he did to the sternum. He is low risk for cardiac disease. No abnormality about the chest on chest x-ray and labs have been unremarkable no ST elevation on the EKG. Dahiana with him and family I will place him on Naprosyn Lidoderm patch and Tylenol. He is establish and follow-up with primary care physician 1 to 2 days for reevaluation. Patient is to return immediately to the emergency department if he is taking a medication symptom getting worse instead of better.   Patient did request work excuse which were given. All questions answered. Clinical Impression:  1. Sternum pain      Disposition referral (if applicable): [address]  [phone]  Schedule an appointment as soon as possible for a visit in 1 day  If symptoms worsen and for re-evaluation. call for an appointment    Tamela Mcgraw MD  [address]  [phone]    Schedule an appointment as soon as possible for a visit in 1 day  to establish a primary care physician for re-evaluation. call for an appointment    [facility] Emergency Department  [place] [id number] [phone]  Go in 1 day  If symptoms worsen    Disposition medications (if applicable):  New Prescriptions    ACETAMINOPHEN (TYLENOL) 500 MG TABLET    Take 1 tablet by mouth 4 times daily as needed for Pain    LIDOCAINE (LIDODERM) 5 %    Place 1 patch onto the skin daily for 10 days 12 hours on, 12 hours off. NAPROXEN (NAPROSYN) 500 MG TABLET    Take 1 tablet by mouth 2 times daily (with meals)     ED Provider Disposition Time  DISPOSITION Decision To Discharge 12/21/2022 07:20:02 PM      Comment: Please note this report has been produced using speech recognition software and may contain errors related to that system including errors in grammar, punctuation, and spelling, as well as words and phrases that may be inappropriate. Efforts were made to edit the dictations.         Grecia Sofia,   12/21/22 1929

## 2022-12-22 NOTE — DISCHARGE INSTRUCTIONS
Emergency room follow-up with a primary care physician in 1 to 2 days for reevaluation. Call for an appointment  Take Naprosyn as prescribed for inflammation pain and swelling  Take Tylenol as needed for pain  Apply Lidoderm pain patch to affected area as needed  Return to the emergency department immediately if increased pain fever chills nausea vomiting or worsening symptoms.

## 2023-12-30 ENCOUNTER — HOSPITAL ENCOUNTER (EMERGENCY)
Age: 28
Discharge: HOME | End: 2023-12-30
Attending: EMERGENCY MEDICINE
Payer: COMMERCIAL

## 2023-12-30 VITALS
SYSTOLIC BLOOD PRESSURE: 135 MMHG | RESPIRATION RATE: 16 BRPM | OXYGEN SATURATION: 97 % | HEART RATE: 67 BPM | TEMPERATURE: 98 F | DIASTOLIC BLOOD PRESSURE: 72 MMHG

## 2023-12-30 DIAGNOSIS — R11.2 NAUSEA AND VOMITING, UNSPECIFIED VOMITING TYPE: ICD-10-CM

## 2023-12-30 DIAGNOSIS — R42 DIZZINESS: ICD-10-CM

## 2023-12-30 DIAGNOSIS — H66.001 NON-RECURRENT ACUTE SUPPURATIVE OTITIS MEDIA OF RIGHT EAR WITHOUT SPONTANEOUS RUPTURE OF TYMPANIC MEMBRANE: Primary | ICD-10-CM

## 2023-12-30 PROCEDURE — 99283 EMERGENCY DEPT VISIT LOW MDM: CPT

## 2023-12-30 PROCEDURE — 6370000000 HC RX 637 (ALT 250 FOR IP): Performed by: EMERGENCY MEDICINE

## 2023-12-30 RX ORDER — AMOXICILLIN AND CLAVULANATE POTASSIUM 875; 125 MG/1; MG/1
1 TABLET, FILM COATED ORAL ONCE
Status: COMPLETED | OUTPATIENT
Start: 2023-12-30 | End: 2023-12-30

## 2023-12-30 RX ORDER — MECLIZINE HYDROCHLORIDE 25 MG/1
25 TABLET ORAL ONCE
Status: COMPLETED | OUTPATIENT
Start: 2023-12-30 | End: 2023-12-30

## 2023-12-30 RX ORDER — MECLIZINE HYDROCHLORIDE 25 MG/1
25 TABLET ORAL 3 TIMES DAILY PRN
Qty: 15 TABLET | Refills: 0 | Status: SHIPPED | OUTPATIENT
Start: 2023-12-30 | End: 2024-01-09

## 2023-12-30 RX ORDER — AMOXICILLIN AND CLAVULANATE POTASSIUM 875; 125 MG/1; MG/1
1 TABLET, FILM COATED ORAL 2 TIMES DAILY
Qty: 20 TABLET | Refills: 0 | Status: SHIPPED | OUTPATIENT
Start: 2023-12-30 | End: 2024-01-09

## 2023-12-30 RX ORDER — ONDANSETRON 4 MG/1
4 TABLET, ORALLY DISINTEGRATING ORAL ONCE
Status: COMPLETED | OUTPATIENT
Start: 2023-12-30 | End: 2023-12-30

## 2023-12-30 RX ORDER — ONDANSETRON 4 MG/1
4 TABLET, ORALLY DISINTEGRATING ORAL 3 TIMES DAILY PRN
Qty: 21 TABLET | Refills: 0 | Status: SHIPPED | OUTPATIENT
Start: 2023-12-30

## 2023-12-30 RX ADMIN — ONDANSETRON 4 MG: 4 TABLET, ORALLY DISINTEGRATING ORAL at 20:51

## 2023-12-30 RX ADMIN — MECLIZINE HYDROCHLORIDE 25 MG: 25 TABLET ORAL at 20:51

## 2023-12-30 RX ADMIN — AMOXICILLIN AND CLAVULANATE POTASSIUM 1 TABLET: 875; 125 TABLET, FILM COATED ORAL at 20:51

## 2023-12-30 ASSESSMENT — PAIN DESCRIPTION - LOCATION: LOCATION: EAR

## 2023-12-30 ASSESSMENT — PAIN SCALES - GENERAL: PAINLEVEL_OUTOF10: 10

## 2023-12-30 ASSESSMENT — PAIN DESCRIPTION - ORIENTATION: ORIENTATION: RIGHT

## 2023-12-31 NOTE — ED PROVIDER NOTES
MEDS GIVEN IN ED:  Medications   amoxicillin-clavulanate (AUGMENTIN) 875-125 MG per tablet 1 tablet (1 tablet Oral Given 12/30/23 2051)   ondansetron (ZOFRAN-ODT) disintegrating tablet 4 mg (4 mg Oral Given 12/30/23 2051)   meclizine (ANTIVERT) tablet 25 mg (25 mg Oral Given 12/30/23 2051)     COURSE & MEDICAL DECISION MAKING  This is a 28-year-old male who presents to the emergency department with complaints of right-sided ear pain with dizziness, nausea, and vomiting.  Initial vital signs are reassuring.  He is nontoxic-appearing on exam.  On exam he does have erythema and bulging of the right tympanic membrane consistent with otitis media without rupture.  He has no mastoid tenderness auricular displacement.  His neurological exam is nonfocal with negative test of skew and he ambulates without difficulty.  At this time we will initiate the patient on Augmentin therapy for his otitis media as well as provide him with Zofran for his nausea and meclizine for his dizziness.  Patient will be discharged home with a prescription for Augmentin, Zofran, and meclizine.  Discharged in stable condition with strict return precautions        Amount and/or Complexity of Data Reviewed  Clinical lab tests: reviewed  Decide to obtain previous medical records or to obtain history from someone other than the patient: yes       -  Patient seen and evaluated in the emergency department.  -  Triage and nursing notes reviewed and incorporated.  -  Old chart records reviewed and incorporated.  -  Work-up included:  See above      Appropriate PPE utilized as indicated for entire patient encounter?  Time of Disposition: See timeline      Independent Imaging Interpretation by me: None     EKG (if obtained): None     Chronic conditions affecting care: None     Discussion with Other Profesionals : None       Social Determinants : None          I am the Primary Clinician of Record.    ?  Discharge Medication List as of 12/30/2023  9:02